# Patient Record
Sex: FEMALE | Race: WHITE | NOT HISPANIC OR LATINO | Employment: FULL TIME | URBAN - METROPOLITAN AREA
[De-identification: names, ages, dates, MRNs, and addresses within clinical notes are randomized per-mention and may not be internally consistent; named-entity substitution may affect disease eponyms.]

---

## 2023-05-04 ENCOUNTER — TELEPHONE (OUTPATIENT)
Dept: PRIMARY CARE | Facility: CLINIC | Age: 73
End: 2023-05-04
Payer: COMMERCIAL

## 2023-05-04 DIAGNOSIS — N39.0 URINARY TRACT INFECTION WITHOUT HEMATURIA, SITE UNSPECIFIED: ICD-10-CM

## 2023-05-05 ENCOUNTER — LAB (OUTPATIENT)
Dept: LAB | Facility: LAB | Age: 73
End: 2023-05-05
Payer: COMMERCIAL

## 2023-05-05 DIAGNOSIS — N39.0 URINARY TRACT INFECTION WITHOUT HEMATURIA, SITE UNSPECIFIED: ICD-10-CM

## 2023-05-05 DIAGNOSIS — N39.0 URINARY TRACT INFECTION WITHOUT HEMATURIA, SITE UNSPECIFIED: Primary | ICD-10-CM

## 2023-05-05 LAB
APPEARANCE, URINE: ABNORMAL
BILIRUBIN, URINE: NEGATIVE
BLOOD, URINE: NEGATIVE
COLOR, URINE: ABNORMAL
GLUCOSE, URINE: NEGATIVE MG/DL
KETONES, URINE: ABNORMAL MG/DL
LEUKOCYTE ESTERASE, URINE: ABNORMAL
MUCUS, URINE: ABNORMAL /LPF
NITRITE, URINE: NEGATIVE
PH, URINE: 5 (ref 5–8)
PROTEIN, URINE: ABNORMAL MG/DL
RBC, URINE: 8 /HPF (ref 0–5)
SPECIFIC GRAVITY, URINE: 1.02 (ref 1–1.03)
SQUAMOUS EPITHELIAL CELLS, URINE: 13 /HPF
UROBILINOGEN, URINE: <2 MG/DL (ref 0–1.9)
WBC, URINE: >182 /HPF (ref 0–5)

## 2023-05-05 PROCEDURE — 81001 URINALYSIS AUTO W/SCOPE: CPT

## 2023-05-05 PROCEDURE — 87186 SC STD MICRODIL/AGAR DIL: CPT

## 2023-05-05 PROCEDURE — 87077 CULTURE AEROBIC IDENTIFY: CPT

## 2023-05-05 PROCEDURE — 87086 URINE CULTURE/COLONY COUNT: CPT

## 2023-05-05 RX ORDER — NITROFURANTOIN 25; 75 MG/1; MG/1
100 CAPSULE ORAL 2 TIMES DAILY
Qty: 14 CAPSULE | Refills: 0 | Status: SHIPPED | OUTPATIENT
Start: 2023-05-05 | End: 2023-05-12

## 2023-05-07 LAB
URINE CULTURE: ABNORMAL
URINE CULTURE: ABNORMAL

## 2023-05-09 ENCOUNTER — TELEPHONE (OUTPATIENT)
Dept: PRIMARY CARE | Facility: CLINIC | Age: 73
End: 2023-05-09
Payer: COMMERCIAL

## 2023-05-15 DIAGNOSIS — I10 ESSENTIAL (PRIMARY) HYPERTENSION: ICD-10-CM

## 2023-05-15 RX ORDER — LOSARTAN POTASSIUM 50 MG/1
TABLET ORAL
Qty: 90 TABLET | Refills: 3 | Status: SHIPPED | OUTPATIENT
Start: 2023-05-15 | End: 2024-06-03

## 2023-05-16 ENCOUNTER — TELEPHONE (OUTPATIENT)
Dept: PRIMARY CARE | Facility: CLINIC | Age: 73
End: 2023-05-16
Payer: COMMERCIAL

## 2023-05-16 NOTE — TELEPHONE ENCOUNTER
Patient say she noticed a swollen vein in her knee , its still swollen and red  with a big bruise now , she want to know if she should worry , she have a appointment on 5/30 with you

## 2023-05-26 ENCOUNTER — APPOINTMENT (OUTPATIENT)
Dept: PRIMARY CARE | Facility: CLINIC | Age: 73
End: 2023-05-26
Payer: COMMERCIAL

## 2023-05-30 ENCOUNTER — OFFICE VISIT (OUTPATIENT)
Dept: PRIMARY CARE | Facility: CLINIC | Age: 73
End: 2023-05-30
Payer: COMMERCIAL

## 2023-05-30 VITALS
HEIGHT: 62 IN | SYSTOLIC BLOOD PRESSURE: 130 MMHG | WEIGHT: 164 LBS | BODY MASS INDEX: 30.18 KG/M2 | DIASTOLIC BLOOD PRESSURE: 80 MMHG

## 2023-05-30 DIAGNOSIS — Z00.00 ROUTINE GENERAL MEDICAL EXAMINATION AT A HEALTH CARE FACILITY: Primary | ICD-10-CM

## 2023-05-30 LAB
ALANINE AMINOTRANSFERASE (SGPT) (U/L) IN SER/PLAS: 19 U/L (ref 7–45)
ALBUMIN (G/DL) IN SER/PLAS: 4.4 G/DL (ref 3.4–5)
ALKALINE PHOSPHATASE (U/L) IN SER/PLAS: 56 U/L (ref 33–136)
ANION GAP IN SER/PLAS: 12 MMOL/L (ref 10–20)
ASPARTATE AMINOTRANSFERASE (SGOT) (U/L) IN SER/PLAS: 19 U/L (ref 9–39)
BASOPHILS (10*3/UL) IN BLOOD BY AUTOMATED COUNT: 0.05 X10E9/L (ref 0–0.1)
BASOPHILS/100 LEUKOCYTES IN BLOOD BY AUTOMATED COUNT: 0.8 % (ref 0–2)
BILIRUBIN TOTAL (MG/DL) IN SER/PLAS: 1.1 MG/DL (ref 0–1.2)
CALCIDIOL (25 OH VITAMIN D3) (NG/ML) IN SER/PLAS: 61 NG/ML
CALCIUM (MG/DL) IN SER/PLAS: 9.7 MG/DL (ref 8.6–10.6)
CARBON DIOXIDE, TOTAL (MMOL/L) IN SER/PLAS: 30 MMOL/L (ref 21–32)
CHLORIDE (MMOL/L) IN SER/PLAS: 106 MMOL/L (ref 98–107)
CHOLESTEROL (MG/DL) IN SER/PLAS: 216 MG/DL (ref 0–199)
CHOLESTEROL IN HDL (MG/DL) IN SER/PLAS: 66.7 MG/DL
CHOLESTEROL/HDL RATIO: 3.2
CREATININE (MG/DL) IN SER/PLAS: 0.66 MG/DL (ref 0.5–1.05)
EOSINOPHILS (10*3/UL) IN BLOOD BY AUTOMATED COUNT: 0.15 X10E9/L (ref 0–0.4)
EOSINOPHILS/100 LEUKOCYTES IN BLOOD BY AUTOMATED COUNT: 2.3 % (ref 0–6)
ERYTHROCYTE DISTRIBUTION WIDTH (RATIO) BY AUTOMATED COUNT: 12.8 % (ref 11.5–14.5)
ERYTHROCYTE MEAN CORPUSCULAR HEMOGLOBIN CONCENTRATION (G/DL) BY AUTOMATED: 31 G/DL (ref 32–36)
ERYTHROCYTE MEAN CORPUSCULAR VOLUME (FL) BY AUTOMATED COUNT: 96 FL (ref 80–100)
ERYTHROCYTES (10*6/UL) IN BLOOD BY AUTOMATED COUNT: 4.66 X10E12/L (ref 4–5.2)
ESTIMATED AVERAGE GLUCOSE FOR HBA1C: 97 MG/DL
GFR FEMALE: >90 ML/MIN/1.73M2
GLUCOSE (MG/DL) IN SER/PLAS: 99 MG/DL (ref 74–99)
HEMATOCRIT (%) IN BLOOD BY AUTOMATED COUNT: 44.9 % (ref 36–46)
HEMOGLOBIN (G/DL) IN BLOOD: 13.9 G/DL (ref 12–16)
HEMOGLOBIN A1C/HEMOGLOBIN TOTAL IN BLOOD: 5 %
IMMATURE GRANULOCYTES/100 LEUKOCYTES IN BLOOD BY AUTOMATED COUNT: 0.2 % (ref 0–0.9)
LDL: 139 MG/DL (ref 0–99)
LEUKOCYTES (10*3/UL) IN BLOOD BY AUTOMATED COUNT: 6.6 X10E9/L (ref 4.4–11.3)
LYMPHOCYTES (10*3/UL) IN BLOOD BY AUTOMATED COUNT: 1.12 X10E9/L (ref 0.8–3)
LYMPHOCYTES/100 LEUKOCYTES IN BLOOD BY AUTOMATED COUNT: 16.9 % (ref 13–44)
MONOCYTES (10*3/UL) IN BLOOD BY AUTOMATED COUNT: 0.27 X10E9/L (ref 0.05–0.8)
MONOCYTES/100 LEUKOCYTES IN BLOOD BY AUTOMATED COUNT: 4.1 % (ref 2–10)
NEUTROPHILS (10*3/UL) IN BLOOD BY AUTOMATED COUNT: 5.01 X10E9/L (ref 1.6–5.5)
NEUTROPHILS/100 LEUKOCYTES IN BLOOD BY AUTOMATED COUNT: 75.7 % (ref 40–80)
NRBC (PER 100 WBCS) BY AUTOMATED COUNT: 0 /100 WBC (ref 0–0)
PLATELETS (10*3/UL) IN BLOOD AUTOMATED COUNT: 286 X10E9/L (ref 150–450)
POTASSIUM (MMOL/L) IN SER/PLAS: 4.4 MMOL/L (ref 3.5–5.3)
PROTEIN TOTAL: 6.8 G/DL (ref 6.4–8.2)
SODIUM (MMOL/L) IN SER/PLAS: 144 MMOL/L (ref 136–145)
THYROTROPIN (MIU/L) IN SER/PLAS BY DETECTION LIMIT <= 0.05 MIU/L: 3.25 MIU/L (ref 0.44–3.98)
TRIGLYCERIDE (MG/DL) IN SER/PLAS: 52 MG/DL (ref 0–149)
UREA NITROGEN (MG/DL) IN SER/PLAS: 9 MG/DL (ref 6–23)
VLDL: 10 MG/DL (ref 0–40)

## 2023-05-30 PROCEDURE — 84443 ASSAY THYROID STIM HORMONE: CPT

## 2023-05-30 PROCEDURE — 80061 LIPID PANEL: CPT

## 2023-05-30 PROCEDURE — 83036 HEMOGLOBIN GLYCOSYLATED A1C: CPT

## 2023-05-30 PROCEDURE — 85025 COMPLETE CBC W/AUTO DIFF WBC: CPT

## 2023-05-30 PROCEDURE — 1160F RVW MEDS BY RX/DR IN RCRD: CPT | Performed by: INTERNAL MEDICINE

## 2023-05-30 PROCEDURE — 1159F MED LIST DOCD IN RCRD: CPT | Performed by: INTERNAL MEDICINE

## 2023-05-30 PROCEDURE — 36415 COLL VENOUS BLD VENIPUNCTURE: CPT

## 2023-05-30 PROCEDURE — 82306 VITAMIN D 25 HYDROXY: CPT

## 2023-05-30 PROCEDURE — 99397 PER PM REEVAL EST PAT 65+ YR: CPT | Performed by: INTERNAL MEDICINE

## 2023-05-30 PROCEDURE — 80053 COMPREHEN METABOLIC PANEL: CPT

## 2023-05-30 RX ORDER — ESTRADIOL 0.1 MG/G
2 CREAM VAGINAL DAILY
COMMUNITY
Start: 2021-02-15 | End: 2023-11-06 | Stop reason: ALTCHOICE

## 2023-05-30 RX ORDER — NALTREXONE HYDROCHLORIDE AND BUPROPION HYDROCHLORIDE 8; 90 MG/1; MG/1
2 TABLET, EXTENDED RELEASE ORAL 2 TIMES DAILY
COMMUNITY
Start: 2021-02-15 | End: 2023-12-12

## 2023-05-30 RX ORDER — CIMETIDINE 800 MG/1
800 TABLET, FILM COATED ORAL NIGHTLY
COMMUNITY
End: 2023-06-05

## 2023-05-30 NOTE — PROGRESS NOTES
Feliciano Marley is a 71 yo F presenting in FU.       *This year left patellar lesion which was long, worm like, red and then with associated bruising, resoled without sequlae     *Skin rash bilat flexor surface c/w eczema   -reassurance   -hydrocortisone PRN on home creams      1. HTN   -losart 50     2. Obesity, overweight  -Contrave   -intol Qsymia   -may consider GLP1s orally if ever approvable  -low 200s peak and 158 tasha, holding steady at 158-164 range     3. GERD, resolved off meds     4. VICKY   -FU with gyne onc for vuvlar colposcopies - last 2.23     5. Palpitations   -zio 12/20 ectopics and short svt runs     6. GERD   -cimetidine 800 mg daily   -repeat EGD c cscope in 12.24     #HM   [ ]due labs   -shingrix x2   -cscope 12.2019 - 5 years- due 12.24- repeat with EGD at that time  -mammo/pap osh gyne   -tdap 2015   -dexa 9.22   -cac 9.22 zero                     Gen Aox3 NAD well appearing   HEENT mmm pharynx clear no cervical LAD   Eyes sclerae clear   CV rrr nl s1/2 no m/r/g  Pulm CTAB no adventitious sounds   Ext warm dry no edema 2+ DP pulse

## 2023-05-30 NOTE — PATIENT INSTRUCTIONS
Feliciano,     Go downstairs for full labs today!   Here are a couple therapists to try calling: Dr. Cassandra Fuentes - ; or anyone at [phone] ([facility]).

## 2023-06-05 DIAGNOSIS — K21.9 GASTRO-ESOPHAGEAL REFLUX DISEASE WITHOUT ESOPHAGITIS: ICD-10-CM

## 2023-06-05 RX ORDER — CIMETIDINE 800 MG/1
TABLET, FILM COATED ORAL
Qty: 90 TABLET | Refills: 3 | Status: SHIPPED | OUTPATIENT
Start: 2023-06-05

## 2023-09-19 ENCOUNTER — OFFICE VISIT (OUTPATIENT)
Dept: PRIMARY CARE | Facility: CLINIC | Age: 73
End: 2023-09-19
Payer: COMMERCIAL

## 2023-09-19 VITALS — SYSTOLIC BLOOD PRESSURE: 117 MMHG | HEART RATE: 76 BPM | DIASTOLIC BLOOD PRESSURE: 79 MMHG

## 2023-09-19 DIAGNOSIS — R21 SKIN RASH: ICD-10-CM

## 2023-09-19 DIAGNOSIS — Z23 ENCOUNTER FOR VACCINATION: Primary | ICD-10-CM

## 2023-09-19 DIAGNOSIS — K59.09 CHRONIC CONSTIPATION: ICD-10-CM

## 2023-09-19 DIAGNOSIS — M54.2 CERVICALGIA: ICD-10-CM

## 2023-09-19 PROCEDURE — 1160F RVW MEDS BY RX/DR IN RCRD: CPT | Performed by: INTERNAL MEDICINE

## 2023-09-19 PROCEDURE — 99214 OFFICE O/P EST MOD 30 MIN: CPT | Performed by: INTERNAL MEDICINE

## 2023-09-19 PROCEDURE — 1159F MED LIST DOCD IN RCRD: CPT | Performed by: INTERNAL MEDICINE

## 2023-09-19 NOTE — PROGRESS NOTES
Feliciano Marley presents for several concerns:     Constipation - taking Senokot gummies, Miralax, Colace - still only 1 BM per week and hard to pass. Usually requires a Fleets suppository to have a BM. Despite daily Senokot and Colace is having very infrequent difficult to pass Bms. No h/o prescription meds. H/o IBS and previously diagnosed CIC.   Cervicalgia - sig pain, crepitus in the neck. No radicular features.   GERD - cimetidine 800 mg daily.     #HM   -screen labs 5.23   -shingrix x2   -cscope 12.2019 - 5 years- due 12.24- repeat with EGD at that time  -mammo/pap osh gyne   -tdap 2015   -dexa 9.22   -cac 9.22 zero          ROS   Gen neg fever neg chills neg malaise   CV neg CP neg palpitations   Neuro neg limb weakness neg parestheasis       Gen Aox3 NAD well appearing   HEENT mmm pharynx clear no cervical LAD   Eyes sclerae clear   CV rrr nl s1/2 no m/r/g  Pulm CTAB no adventitious sounds   Ext warm dry no edema 2+ DP pulse       A/P     Take Colace and Senokot once daily along with 64 oz of water or as close to this as you can. I will try though to prescribe Linzess which if covered, is one pill once a day in the morning. Try it for 2 weeks then give me an update. Take the cimetidine daily for the acid. Once things settle down, let's see me back to discuss meds like Ozempic for weight loss.   Call the referral line to schedule a visit with physical therapy.   Have [facility] [place] Dr. Ravi [phone] to schedule colonoscopy, his order is in!   For the skin rash, call the referral line to schedule visits for both you and [place] - I recommend Dr. Vincent Grossman.

## 2023-09-19 NOTE — PATIENT INSTRUCTIONS
Feliciano,     Take Colace and Senokot once daily along with 64 oz of water or as close to this as you can. I will try though to prescribe Linzess which if covered, is one pill once a day in the morning. Try it for 2 weeks then give me an update. Take the cimetidine daily for the acid. Once things settle down, let's see me back to discuss meds like Ozempic for weight loss.   Call the referral line to schedule a visit with physical therapy.   Have [facility] call Dr. Ravi [phone] to schedule colonoscopy, his order is in!   For the skin rash, call the referral line to schedule visits for both you and [place] - I recommend Dr. Vincent Grossman.     CL

## 2023-10-18 ENCOUNTER — TELEPHONE (OUTPATIENT)
Dept: PRIMARY CARE | Facility: CLINIC | Age: 73
End: 2023-10-18
Payer: COMMERCIAL

## 2023-11-04 PROBLEM — I10 BENIGN ESSENTIAL HTN: Status: ACTIVE | Noted: 2023-11-04

## 2023-11-04 PROBLEM — D22.61 MELANOCYTIC NEVI OF RIGHT UPPER LIMB, INCLUDING SHOULDER: Status: ACTIVE | Noted: 2022-10-12

## 2023-11-04 PROBLEM — D07.1 VULVAR INTRAEPITHELIAL NEOPLASIA (VIN) GRADE 3: Status: ACTIVE | Noted: 2021-01-29

## 2023-11-04 PROBLEM — D22.5 MELANOCYTIC NEVI OF TRUNK: Status: ACTIVE | Noted: 2022-10-12

## 2023-11-04 PROBLEM — L82.0 INFLAMED SEBORRHEIC KERATOSIS: Status: ACTIVE | Noted: 2022-10-12

## 2023-11-04 PROBLEM — H61.20 CERUMINOSIS: Status: ACTIVE | Noted: 2023-11-04

## 2023-11-04 PROBLEM — N90.5 VULVAR ATROPHY: Status: ACTIVE | Noted: 2023-11-04

## 2023-11-04 PROBLEM — D18.01 HEMANGIOMA OF SKIN AND SUBCUTANEOUS TISSUE: Status: ACTIVE | Noted: 2022-10-12

## 2023-11-04 PROBLEM — Z04.9 CONDITION NOT FOUND: Status: ACTIVE | Noted: 2023-11-04

## 2023-11-04 PROBLEM — F41.9 ANXIETY: Status: ACTIVE | Noted: 2021-01-29

## 2023-11-04 PROBLEM — N90.89 LESION OF VULVA: Status: ACTIVE | Noted: 2021-01-29

## 2023-11-04 PROBLEM — D22.71 MELANOCYTIC NEVI OF RIGHT LOWER LIMB, INCLUDING HIP: Status: ACTIVE | Noted: 2022-10-12

## 2023-11-04 PROBLEM — D22.72 MELANOCYTIC NEVI OF LEFT LOWER LIMB, INCLUDING HIP: Status: ACTIVE | Noted: 2022-10-12

## 2023-11-04 PROBLEM — R00.2 PALPITATIONS: Status: ACTIVE | Noted: 2021-01-29

## 2023-11-04 PROBLEM — K21.9 GERD (GASTROESOPHAGEAL REFLUX DISEASE): Status: ACTIVE | Noted: 2023-11-04

## 2023-11-04 PROBLEM — R73.9 BORDERLINE HYPERGLYCEMIA: Status: ACTIVE | Noted: 2023-11-04

## 2023-11-04 PROBLEM — L82.1 OTHER SEBORRHEIC KERATOSIS: Status: ACTIVE | Noted: 2022-10-12

## 2023-11-04 PROBLEM — D22.62 MELANOCYTIC NEVI OF LEFT UPPER LIMB, INCLUDING SHOULDER: Status: ACTIVE | Noted: 2022-10-12

## 2023-11-04 PROBLEM — L81.4 OTHER MELANIN HYPERPIGMENTATION: Status: ACTIVE | Noted: 2022-10-12

## 2023-11-04 PROBLEM — R30.0 DYSURIA: Status: ACTIVE | Noted: 2023-11-04

## 2023-11-04 PROBLEM — B00.9 HERPES SIMPLEX VIRUS (HSV) INFECTION: Status: ACTIVE | Noted: 2021-01-29

## 2023-11-04 PROBLEM — R03.0 PREHYPERTENSION: Status: ACTIVE | Noted: 2021-01-29

## 2023-11-04 PROBLEM — M85.80 BORDERLINE OSTEOPENIA: Status: ACTIVE | Noted: 2023-11-04

## 2023-11-04 PROBLEM — E66.9 ADULT-ONSET OBESITY: Status: ACTIVE | Noted: 2023-11-04

## 2023-11-04 PROBLEM — Z86.39 H/O: OBESITY: Status: ACTIVE | Noted: 2021-01-29

## 2023-11-05 NOTE — PATIENT INSTRUCTIONS
Routine Gynecologic Visit:  You were seen today for a routine gyn visit with normal findings on exam  Your pap smear was done today. You will receive results by phone/MyChart in 1-2 weeks. You should still continue to get annual breast and pelvic exams in the office.  An order was placed in the system for mammogram. Please get done at your earliest convenience  If you are having any gynecologic issues in the next year you should call the office. [phone] ([place]) or [phone] ([place])

## 2023-11-06 ENCOUNTER — OFFICE VISIT (OUTPATIENT)
Dept: OBSTETRICS AND GYNECOLOGY | Facility: CLINIC | Age: 73
End: 2023-11-06
Payer: COMMERCIAL

## 2023-11-06 VITALS — SYSTOLIC BLOOD PRESSURE: 142 MMHG | BODY MASS INDEX: 29.05 KG/M2 | DIASTOLIC BLOOD PRESSURE: 72 MMHG | HEIGHT: 63 IN

## 2023-11-06 DIAGNOSIS — Z01.419 WELL WOMAN EXAM: Primary | ICD-10-CM

## 2023-11-06 DIAGNOSIS — Z91.89 HISTORY OF DES EXPOSURE IN UTERO: ICD-10-CM

## 2023-11-06 DIAGNOSIS — Z12.31 VISIT FOR SCREENING MAMMOGRAM: ICD-10-CM

## 2023-11-06 PROCEDURE — 3078F DIAST BP <80 MM HG: CPT | Performed by: OBSTETRICS & GYNECOLOGY

## 2023-11-06 PROCEDURE — 88175 CYTOPATH C/V AUTO FLUID REDO: CPT

## 2023-11-06 PROCEDURE — 1160F RVW MEDS BY RX/DR IN RCRD: CPT | Performed by: OBSTETRICS & GYNECOLOGY

## 2023-11-06 PROCEDURE — 99397 PER PM REEVAL EST PAT 65+ YR: CPT | Performed by: OBSTETRICS & GYNECOLOGY

## 2023-11-06 PROCEDURE — 1036F TOBACCO NON-USER: CPT | Performed by: OBSTETRICS & GYNECOLOGY

## 2023-11-06 PROCEDURE — 1159F MED LIST DOCD IN RCRD: CPT | Performed by: OBSTETRICS & GYNECOLOGY

## 2023-11-06 PROCEDURE — 3077F SYST BP >= 140 MM HG: CPT | Performed by: OBSTETRICS & GYNECOLOGY

## 2023-11-06 PROCEDURE — 88141 CYTOPATH C/V INTERPRET: CPT | Performed by: PATHOLOGY

## 2023-11-06 PROCEDURE — 87624 HPV HI-RISK TYP POOLED RSLT: CPT

## 2023-11-06 ASSESSMENT — ENCOUNTER SYMPTOMS
CONSTITUTIONAL NEGATIVE: 1
CARDIOVASCULAR NEGATIVE: 1
NEUROLOGICAL NEGATIVE: 1
GASTROINTESTINAL NEGATIVE: 1
MUSCULOSKELETAL NEGATIVE: 1
RESPIRATORY NEGATIVE: 1
PSYCHIATRIC NEGATIVE: 1

## 2023-11-06 NOTE — PROGRESS NOTES
"Subjective   Joyce Marley is a 72 y.o. female who presents for annual exam. The patient has no complaints today. The patient is sexually active. GYN screening history: last pap: ASCUS/HPV neg . The patient is not taking hormone replacement therapy. Patient denies post-menopausal vaginal bleeding.. The patient wears seatbelts: yes. The patient participates in regular exercise: yes. Has the patient ever been transfused or tattooed?: not asked. The patient reports that there is not domestic violence in their life.     Menstrual History:  OB History          2    Para   2    Term                AB        Living             SAB        IAB        Ectopic        Multiple        Live Births                      No LMP recorded. Patient is postmenopausal.         Review of Systems   Constitutional: Negative.    Respiratory: Negative.     Cardiovascular: Negative.    Gastrointestinal: Negative.    Genitourinary: Negative.    Musculoskeletal: Negative.    Skin: Negative.    Neurological: Negative.    Psychiatric/Behavioral: Negative.          Objective   /72   Ht 1.6 m (5' 3\")   BMI 29.05 kg/m²     General:   alert and oriented, in no acute distress   Heart: regular rate and rhythm   Lungs: clear to auscultation bilaterally   Abdomen: soft, non-tender, without masses or organomegaly   Pelvis: Vulva normal in appearance without discoloration, rashes, lesions. Vagina is negative for blood, discharge, lesions. Uterus is small, mobile, non tender. There is no cervical motion tenderness, adnexal masses/tenderness.    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

## 2023-11-15 LAB

## 2023-12-06 ENCOUNTER — OFFICE VISIT (OUTPATIENT)
Dept: OBSTETRICS AND GYNECOLOGY | Facility: CLINIC | Age: 73
End: 2023-12-06
Payer: COMMERCIAL

## 2023-12-06 VITALS — SYSTOLIC BLOOD PRESSURE: 134 MMHG | WEIGHT: 160 LBS | DIASTOLIC BLOOD PRESSURE: 84 MMHG | BODY MASS INDEX: 28.34 KG/M2

## 2023-12-06 DIAGNOSIS — R87.610 ASCUS OF CERVIX WITH NEGATIVE HIGH RISK HPV: Primary | ICD-10-CM

## 2023-12-06 PROCEDURE — 3079F DIAST BP 80-89 MM HG: CPT | Performed by: OBSTETRICS & GYNECOLOGY

## 2023-12-06 PROCEDURE — 3075F SYST BP GE 130 - 139MM HG: CPT | Performed by: OBSTETRICS & GYNECOLOGY

## 2023-12-06 PROCEDURE — 1036F TOBACCO NON-USER: CPT | Performed by: OBSTETRICS & GYNECOLOGY

## 2023-12-06 PROCEDURE — 1160F RVW MEDS BY RX/DR IN RCRD: CPT | Performed by: OBSTETRICS & GYNECOLOGY

## 2023-12-06 PROCEDURE — 57452 EXAM OF CERVIX W/SCOPE: CPT | Performed by: OBSTETRICS & GYNECOLOGY

## 2023-12-06 PROCEDURE — 1159F MED LIST DOCD IN RCRD: CPT | Performed by: OBSTETRICS & GYNECOLOGY

## 2023-12-06 NOTE — PROGRESS NOTES
Patient ID: Joyce Marley is a 72 y.o. female.    Colposcopy    Date/Time: 12/6/2023 3:08 PM    Performed by: Betty Alvarez DO  Authorized by: Betty Alvarez DO    Consent:     Patient questions answered: yes      Risks and benefits of the procedure and its alternatives discussed: yes      Procedural risks discussed:  Bleeding and infection    Consent obtained:  Written    Consent given by:  Patient  Indication:     Other indication(s): clinical abnormality    Pre-procedure:     Speculum was placed in the vagina: yes      Prep solution(s): acetic acid    Procedure:     Colposcopy with: colposcopy only      Cervix visibility: fully visualized      SCJ visibility: fully visualized    Post-procedure:     Patient tolerance of procedure:  Patient tolerated the procedure well with no immediate complications    Instructions and paperwork completed: yes      Educational handouts given: yes    Betty Alvarez DO

## 2023-12-06 NOTE — PATIENT INSTRUCTIONS
Visit for Colposcopy:  You were seen today for colposcopy for abnormal pap smear  Colposcopy is a type of examination that allows for better visualization of abnormal cervical cells, generally caused by infection with Human Papilloma Virus (HPV)  You may have had a biopsy done to better diagnose cervical abnormalities and plan for future management. If so, you may have some spotting to light vaginal bleeding for a few days. Sometimes a medication called Monsel's is used after cervical biopsies to stop bleeding, and this medication often causes a discharge that looks like coffee grounds  If a biopsy was done during your colposcopy you will receive your results by phone/MyChart  Recommendations for follow up pap smears/treatment is dependent on colposcopy/biopsy findings  Monitor for signs of infection and call the office for any fever, chills, severe/worsening pelvic pain, foul smelling vaginal discharge. [phone] [place] [phone]

## 2023-12-12 ENCOUNTER — TELEPHONE (OUTPATIENT)
Dept: PRIMARY CARE | Facility: CLINIC | Age: 73
End: 2023-12-12
Payer: COMMERCIAL

## 2023-12-12 DIAGNOSIS — Z86.39 PERSONAL HISTORY OF OTHER ENDOCRINE, NUTRITIONAL AND METABOLIC DISEASE: Primary | ICD-10-CM

## 2023-12-12 RX ORDER — NALTREXONE HYDROCHLORIDE AND BUPROPION HYDROCHLORIDE 8; 90 MG/1; MG/1
2 TABLET, EXTENDED RELEASE ORAL 2 TIMES DAILY
Qty: 360 TABLET | Refills: 0 | Status: SHIPPED | OUTPATIENT
Start: 2023-12-12 | End: 2024-02-19

## 2024-02-02 ENCOUNTER — OFFICE VISIT (OUTPATIENT)
Dept: OPHTHALMOLOGY | Facility: CLINIC | Age: 74
End: 2024-02-02
Payer: COMMERCIAL

## 2024-02-02 DIAGNOSIS — H52.03 HYPERMETROPIA OF BOTH EYES: ICD-10-CM

## 2024-02-02 DIAGNOSIS — H52.223 REGULAR ASTIGMATISM OF BOTH EYES: ICD-10-CM

## 2024-02-02 DIAGNOSIS — H35.61 RETINAL HEMORRHAGE OF RIGHT EYE: Primary | ICD-10-CM

## 2024-02-02 DIAGNOSIS — H52.4 PRESBYOPIA: ICD-10-CM

## 2024-02-02 PROBLEM — H52.203 HYPEROPIA OF BOTH EYES WITH ASTIGMATISM AND PRESBYOPIA: Status: ACTIVE | Noted: 2024-02-02

## 2024-02-02 PROCEDURE — 92015 DETERMINE REFRACTIVE STATE: CPT | Performed by: OPTOMETRIST

## 2024-02-02 PROCEDURE — 92134 CPTRZ OPH DX IMG PST SGM RTA: CPT | Mod: BILATERAL PROCEDURE | Performed by: OPTOMETRIST

## 2024-02-02 PROCEDURE — 99204 OFFICE O/P NEW MOD 45 MIN: CPT | Performed by: OPTOMETRIST

## 2024-02-02 ASSESSMENT — CONF VISUAL FIELD
OS_SUPERIOR_TEMPORAL_RESTRICTION: 0
OS_NORMAL: 1
METHOD: COUNTING FINGERS
OD_INFERIOR_NASAL_RESTRICTION: 0
OD_SUPERIOR_NASAL_RESTRICTION: 0
OS_INFERIOR_NASAL_RESTRICTION: 0
OD_NORMAL: 1
OS_INFERIOR_TEMPORAL_RESTRICTION: 0
OD_SUPERIOR_TEMPORAL_RESTRICTION: 0
OS_SUPERIOR_NASAL_RESTRICTION: 0
OD_INFERIOR_TEMPORAL_RESTRICTION: 0

## 2024-02-02 ASSESSMENT — REFRACTION_WEARINGRX
SPECS_TYPE: COMPUTER BIFOCAL
OD_AXIS: 026
OD_SPHERE: +1.50
OS_ADD: +2.75
OS_AXIS: 158
OS_CYLINDER: +0.75
OD_ADD: +2.75
OD_ADD: +1.50
OS_ADD: +1.50
OS_CYLINDER: +0.75
OD_AXIS: 026
OD_CYLINDER: +0.75
OS_SPHERE: +0.50
OS_AXIS: 158
OD_CYLINDER: +0.75
OS_SPHERE: +1.75
OD_SPHERE: +0.25

## 2024-02-02 ASSESSMENT — VISUAL ACUITY
CORRECTION_TYPE: GLASSES
OS_CC: 20/30+2
METHOD: SNELLEN - LINEAR
OS_PH_CC: 20/20-
OD_CC: 20/25+2

## 2024-02-02 ASSESSMENT — TONOMETRY
OS_IOP_MMHG: 18
IOP_METHOD: GOLDMANN APPLANATION
OD_IOP_MMHG: 18

## 2024-02-02 ASSESSMENT — REFRACTION_MANIFEST
OS_ADD: +2.75
OD_CYLINDER: +0.50
OS_AXIS: 158
OD_AXIS: 036
OD_SPHERE: +0.50
OS_CYLINDER: +0.25
OS_SPHERE: +1.00
OD_ADD: +2.75

## 2024-02-02 ASSESSMENT — ENCOUNTER SYMPTOMS
PSYCHIATRIC NEGATIVE: 0
ENDOCRINE NEGATIVE: 0
MUSCULOSKELETAL NEGATIVE: 0
CARDIOVASCULAR NEGATIVE: 1
RESPIRATORY NEGATIVE: 0
ALLERGIC/IMMUNOLOGIC NEGATIVE: 0
CONSTITUTIONAL NEGATIVE: 0
GASTROINTESTINAL NEGATIVE: 0
HEMATOLOGIC/LYMPHATIC NEGATIVE: 0
EYES NEGATIVE: 0
NEUROLOGICAL NEGATIVE: 0

## 2024-02-02 ASSESSMENT — SLIT LAMP EXAM - LIDS
COMMENTS: NORMAL
COMMENTS: NORMAL

## 2024-02-02 ASSESSMENT — CUP TO DISC RATIO
OS_RATIO: 0.25
OD_RATIO: 0.25

## 2024-02-02 ASSESSMENT — EXTERNAL EXAM - LEFT EYE: OS_EXAM: NORMAL

## 2024-02-02 ASSESSMENT — EXTERNAL EXAM - RIGHT EYE: OD_EXAM: NORMAL

## 2024-02-02 NOTE — PROGRESS NOTES
Assessment/Plan   Diagnoses and all orders for this visit:  Retinal hemorrhage of right eye  -     OCT, Retina - OU - Both Eyes  Small pocket of edema in area of dot blot hemorrhage inferior to macula. Likely due to valsalva maneuver due to no other signs of retinopathy and patients reported history of excessive sneezing. Monitor in 3-4 months with DFE and OCT Macula or sooner if any other visual problems    Hyperopia of both eyes with astigmatism and presbyopia  Released updated distance spec rx  and computer rx. RTC in 3-4 months for DFE & OCT or sooner if any changes in vision.

## 2024-02-05 ENCOUNTER — OFFICE VISIT (OUTPATIENT)
Dept: DERMATOLOGY | Facility: HOSPITAL | Age: 74
End: 2024-02-05
Payer: COMMERCIAL

## 2024-02-05 DIAGNOSIS — B35.4 TINEA CORPORIS: Primary | ICD-10-CM

## 2024-02-05 DIAGNOSIS — L64.9 ANDROGENETIC ALOPECIA: ICD-10-CM

## 2024-02-05 DIAGNOSIS — R21 SKIN RASH: ICD-10-CM

## 2024-02-05 PROBLEM — D22.72 MELANOCYTIC NEVI OF LEFT LOWER LIMB, INCLUDING HIP: Status: RESOLVED | Noted: 2022-10-12 | Resolved: 2024-02-05

## 2024-02-05 PROBLEM — D22.61 MELANOCYTIC NEVI OF RIGHT UPPER LIMB, INCLUDING SHOULDER: Status: RESOLVED | Noted: 2022-10-12 | Resolved: 2024-02-05

## 2024-02-05 PROBLEM — L82.1 OTHER SEBORRHEIC KERATOSIS: Status: RESOLVED | Noted: 2022-10-12 | Resolved: 2024-02-05

## 2024-02-05 PROBLEM — D18.01 HEMANGIOMA OF SKIN AND SUBCUTANEOUS TISSUE: Status: RESOLVED | Noted: 2022-10-12 | Resolved: 2024-02-05

## 2024-02-05 PROBLEM — L82.0 INFLAMED SEBORRHEIC KERATOSIS: Status: RESOLVED | Noted: 2022-10-12 | Resolved: 2024-02-05

## 2024-02-05 PROBLEM — D22.71 MELANOCYTIC NEVI OF RIGHT LOWER LIMB, INCLUDING HIP: Status: RESOLVED | Noted: 2022-10-12 | Resolved: 2024-02-05

## 2024-02-05 PROBLEM — D22.62 MELANOCYTIC NEVI OF LEFT UPPER LIMB, INCLUDING SHOULDER: Status: RESOLVED | Noted: 2022-10-12 | Resolved: 2024-02-05

## 2024-02-05 PROBLEM — L81.4 OTHER MELANIN HYPERPIGMENTATION: Status: RESOLVED | Noted: 2022-10-12 | Resolved: 2024-02-05

## 2024-02-05 PROCEDURE — 1159F MED LIST DOCD IN RCRD: CPT | Performed by: DERMATOLOGY

## 2024-02-05 PROCEDURE — 99213 OFFICE O/P EST LOW 20 MIN: CPT | Performed by: DERMATOLOGY

## 2024-02-05 PROCEDURE — 1160F RVW MEDS BY RX/DR IN RCRD: CPT | Performed by: DERMATOLOGY

## 2024-02-05 PROCEDURE — 87220 TISSUE EXAM FOR FUNGI: CPT | Performed by: DERMATOLOGY

## 2024-02-05 PROCEDURE — 1036F TOBACCO NON-USER: CPT | Performed by: DERMATOLOGY

## 2024-02-05 RX ORDER — KETOCONAZOLE 20 MG/G
CREAM TOPICAL 2 TIMES DAILY
Qty: 60 G | Refills: 1 | Status: SHIPPED | OUTPATIENT
Start: 2024-02-05

## 2024-02-05 ASSESSMENT — PATIENT GLOBAL ASSESSMENT (PGA)
PATIENT GLOBAL ASSESSMENT: PATIENT GLOBAL ASSESSMENT:  2 - MILD
PATIENT GLOBAL ASSESSMENT: PATIENT GLOBAL ASSESSMENT:  1 - CLEAR

## 2024-02-05 ASSESSMENT — DERMATOLOGY QUALITY OF LIFE (QOL) ASSESSMENT
RATE HOW BOTHERED YOU ARE BY SYMPTOMS OF YOUR SKIN PROBLEM (EG, ITCHING, STINGING BURNING, HURTING OR SKIN IRRITATION): 0 - NEVER BOTHERED
RATE HOW EMOTIONALLY BOTHERED YOU ARE BY YOUR SKIN PROBLEM (FOR EXAMPLE, WORRY, EMBARRASSMENT, FRUSTRATION): 0 - NEVER BOTHERED
RATE HOW EMOTIONALLY BOTHERED YOU ARE BY YOUR SKIN PROBLEM (FOR EXAMPLE, WORRY, EMBARRASSMENT, FRUSTRATION): 0 - NEVER BOTHERED
RATE HOW BOTHERED YOU ARE BY EFFECTS OF YOUR SKIN PROBLEMS ON YOUR ACTIVITIES (EG, GOING OUT, ACCOMPLISHING WHAT YOU WANT, WORK ACTIVITIES OR YOUR RELATIONSHIPS WITH OTHERS): 0 - NEVER BOTHERED
RATE HOW BOTHERED YOU ARE BY SYMPTOMS OF YOUR SKIN PROBLEM (EG, ITCHING, STINGING BURNING, HURTING OR SKIN IRRITATION): 0 - NEVER BOTHERED
RATE HOW BOTHERED YOU ARE BY EFFECTS OF YOUR SKIN PROBLEMS ON YOUR ACTIVITIES (EG, GOING OUT, ACCOMPLISHING WHAT YOU WANT, WORK ACTIVITIES OR YOUR RELATIONSHIPS WITH OTHERS): 0 - NEVER BOTHERED
ARE THERE EXCLUSIONS OR EXCEPTIONS FOR THE QUALITY OF LIFE ASSESSMENT: NO
DATE THE QUALITY-OF-LIFE ASSESSMENT WAS COMPLETED: 66875
DATE THE QUALITY-OF-LIFE ASSESSMENT WAS COMPLETED: 66875

## 2024-02-05 ASSESSMENT — DERMATOLOGY PATIENT ASSESSMENT
DO YOU HAVE IRREGULAR MENSTRUAL CYCLES: NO
ARE YOU TRYING TO GET PREGNANT: NO
DO YOU USE A TANNING BED: NO
DO YOU USE SUNSCREEN: OCCASIONALLY
HAVE YOU HAD OR DO YOU HAVE A STAPH INFECTION: NO
WHERE ARE THESE NEW OR CHANGING LESIONS LOCATED: ARMS
DO YOU HAVE ANY NEW OR CHANGING LESIONS: YES
ARE YOU ON BIRTH CONTROL: NO
ARE YOU AN ORGAN TRANSPLANT RECIPIENT: NO

## 2024-02-05 ASSESSMENT — ITCH NUMERIC RATING SCALE
HOW SEVERE IS YOUR ITCHING?: 3
HOW SEVERE IS YOUR ITCHING?: 0

## 2024-02-05 NOTE — PATIENT INSTRUCTIONS
Recommend Minoxidil (Rogaine) 5% foam  Apply ONCE per day  [facility] / [employer] / [employer] [facility] [employer] have the best prices

## 2024-02-05 NOTE — PROGRESS NOTES
Subjective     Joyce Marley is a 73 y.o. female who presents for the following: Suspicious Skin Lesion (arms).     Last derm visit 10/2022 for Full Skin Exam, no lesions of concern. 2 inflamed seborrheic keratoses on clavicle treated with liquid nitrogen       Review of Systems:  No other skin or systemic complaints other than what is documented elsewhere in the note.    The following portions of the chart were reviewed this encounter and updated as appropriate:   Tobacco  Allergies  Meds  Problems  Med Hx  Surg Hx         Skin Cancer History  No skin cancer on file.      Specialty Problems          Dermatology Problems    Melanocytic nevi of trunk        Objective   Well appearing patient in no apparent distress; mood and affect are within normal limits.    A focused skin examination was performed. All findings within normal limits unless otherwise noted below.    Assessment/Plan   1. Tinea corporis (2)  Left Antecubital Fossa, Right Antecubital Fossa  Pink scaly annular plaques, 3x5 cm symmetrically in antecubital fossae    Rash prsent since October, itchy, transiently responded to cortisone and then wound return and be larger. She does have a dog. 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     CORBIN today positive for long branching hyphae     Related Procedures  POCT KOH Prep - DERM Manually Resulted    Related Medications  ketoconazole (NIZOral) 2 % cream  Apply topically 2 times a day. To affected areas of inside of elbows for 4 weeks    2. Skin rash    Related Procedures  Referral to Dermatology    3. Androgenetic alopecia  Scalp  Decreased hair density in androgenetic areas. On dermoscopy, miniaturized hair follicles are seen and hair shafts of varying diameters are noted.                      - start with OTC minoxidil (see AVS)  - follow up in 6 months and gauge response and see if further work-up and/or treatment are indicated    Related Procedures  Follow Up In Dermatology -  Established Patient        Follow up 6 months hair

## 2024-02-06 LAB — POC KOH - DERM RESULT 1: ABNORMAL

## 2024-02-13 ENCOUNTER — TELEPHONE (OUTPATIENT)
Dept: DERMATOLOGY | Facility: CLINIC | Age: 74
End: 2024-02-13
Payer: COMMERCIAL

## 2024-02-13 DIAGNOSIS — B35.4 TINEA CORPORIS: Primary | ICD-10-CM

## 2024-02-13 DIAGNOSIS — Z51.81 ENCOUNTER FOR THERAPEUTIC DRUG LEVEL MONITORING: ICD-10-CM

## 2024-02-13 RX ORDER — LORAZEPAM 0.5 MG/1
TABLET ORAL
COMMUNITY
Start: 2021-01-11

## 2024-02-13 NOTE — TELEPHONE ENCOUNTER
Yes the ketoconazole cream will help with the itching.    She can take benadryl pills at night to help with the itching.     If new spots keep appearing we can switch to an oral medication - it would be terbinafine 250mg daily for 4 weeks. I would need to check blood work before we could start it to make sure there are no problems with her liver.

## 2024-02-13 NOTE — TELEPHONE ENCOUNTER
Pt is concerned, she found another spot behind her knee and her rash has become extremly itching , she wants to know if she can scratch her itch ??? Also needs a refill for cream , I returned call to pt to make her aware she has 1 rf on her ketoconozole cr --pt wanted to know if there is something that she can get for the itching or is the ketoconozole for her itch ??it is fkeeping her up at night ..

## 2024-02-19 DIAGNOSIS — Z86.39 PERSONAL HISTORY OF OTHER ENDOCRINE, NUTRITIONAL AND METABOLIC DISEASE: ICD-10-CM

## 2024-02-19 RX ORDER — NALTREXONE HYDROCHLORIDE AND BUPROPION HYDROCHLORIDE 8; 90 MG/1; MG/1
2 TABLET, EXTENDED RELEASE ORAL 2 TIMES DAILY
Qty: 360 TABLET | Refills: 0 | Status: SHIPPED | OUTPATIENT
Start: 2024-02-19

## 2024-02-22 ENCOUNTER — OFFICE VISIT (OUTPATIENT)
Dept: GYNECOLOGIC ONCOLOGY | Facility: CLINIC | Age: 74
End: 2024-02-22
Payer: COMMERCIAL

## 2024-02-22 VITALS
DIASTOLIC BLOOD PRESSURE: 85 MMHG | OXYGEN SATURATION: 100 % | TEMPERATURE: 96.8 F | HEART RATE: 77 BPM | RESPIRATION RATE: 18 BRPM | SYSTOLIC BLOOD PRESSURE: 139 MMHG

## 2024-02-22 DIAGNOSIS — D07.1 VULVAR INTRAEPITHELIAL NEOPLASIA (VIN) GRADE 3: Primary | ICD-10-CM

## 2024-02-22 PROCEDURE — 1159F MED LIST DOCD IN RCRD: CPT | Performed by: NURSE PRACTITIONER

## 2024-02-22 PROCEDURE — 1126F AMNT PAIN NOTED NONE PRSNT: CPT | Performed by: NURSE PRACTITIONER

## 2024-02-22 PROCEDURE — 99213 OFFICE O/P EST LOW 20 MIN: CPT | Performed by: NURSE PRACTITIONER

## 2024-02-22 PROCEDURE — 1036F TOBACCO NON-USER: CPT | Performed by: NURSE PRACTITIONER

## 2024-02-22 PROCEDURE — 3075F SYST BP GE 130 - 139MM HG: CPT | Performed by: NURSE PRACTITIONER

## 2024-02-22 PROCEDURE — 1160F RVW MEDS BY RX/DR IN RCRD: CPT | Performed by: NURSE PRACTITIONER

## 2024-02-22 PROCEDURE — 3079F DIAST BP 80-89 MM HG: CPT | Performed by: NURSE PRACTITIONER

## 2024-02-22 RX ORDER — TERBINAFINE HYDROCHLORIDE 250 MG/1
250 TABLET ORAL DAILY
Qty: 30 TABLET | Refills: 0 | Status: CANCELLED | OUTPATIENT
Start: 2024-02-22

## 2024-02-22 ASSESSMENT — COLUMBIA-SUICIDE SEVERITY RATING SCALE - C-SSRS
6. HAVE YOU EVER DONE ANYTHING, STARTED TO DO ANYTHING, OR PREPARED TO DO ANYTHING TO END YOUR LIFE?: NO
2. HAVE YOU ACTUALLY HAD ANY THOUGHTS OF KILLING YOURSELF?: NO
1. IN THE PAST MONTH, HAVE YOU WISHED YOU WERE DEAD OR WISHED YOU COULD GO TO SLEEP AND NOT WAKE UP?: NO

## 2024-02-22 ASSESSMENT — PATIENT HEALTH QUESTIONNAIRE - PHQ9
2. FEELING DOWN, DEPRESSED OR HOPELESS: NOT AT ALL
1. LITTLE INTEREST OR PLEASURE IN DOING THINGS: NOT AT ALL
SUM OF ALL RESPONSES TO PHQ9 QUESTIONS 1 AND 2: 0

## 2024-02-22 ASSESSMENT — PAIN SCALES - GENERAL: PAINLEVEL: 0-NO PAIN

## 2024-02-22 ASSESSMENT — ENCOUNTER SYMPTOMS
LOSS OF SENSATION IN FEET: 0
DEPRESSION: 0
OCCASIONAL FEELINGS OF UNSTEADINESS: 0

## 2024-02-22 NOTE — PROGRESS NOTES
Patient ID: Joyce Marley is a 73 y.o. female.  Referring Physician: No referring provider defined for this encounter.  Primary Care Provider: Laura Chen MD    Subjective    HPI    Joyce is a 72 year old female with VICKY 2-3 s/p WLE on 1/13/21. Here for 6 month surveillance visit. Patient denies any vaginal bleeding or abnormal discharge. Patient denies any changes in bowel or bladder habits. Appetite has been good. Energy levels are baseline. Patient denies urinary leakage or incontinence. Denies any weight loss or weight gain. Does not report any abdominal pain or bloating. Denies any vulvar lumps or itching. Alternating constipation and diarrhea due to IBS. She saw Dr. Fernández in November 2023, pap was ASCUS, HPV negative. Colposcopy was performed due to history of dysplasia and MAURICE exposure. No acetowhite changes noted per Dr. Hawkins's note. Overall feeling well.     1/13/2021:  WLEV for VICKY-2      Final pathology:    FOCAL HIGH-GRADE SQUAMOUS INTRAEPITHELIAL LESION (VICKY 2-3).     Note: There is a 0.5 mm focus of residual high-grade squamous intraepithelial lesion present adjacent to the previous biopsy  site changes. The peripheral and deep margins are uninvolved; VICKY is 4.5 mm from the closest margin (lateral, A5).   There is no evidence of invasive carcinoma.       Pap 11/2022 (Dr. Hawkins): ASCUS, HPV negative. Colposcopy done due to history of MAURICE exposure, no biopsies indicated.    Pap 11/2023 (Dr. Hawkins): ASCUS, HPV negative. Colposcopy done due to history of MAURICE exposure, no biopsies indicated.    Objective    BSA: There is no height or weight on file to calculate BSA.  /85   Pulse 77   Temp 36 °C (96.8 °F)   Resp 18   SpO2 100%      Physical Exam  Vitals and nursing note reviewed.   Constitutional:       Appearance: Normal appearance.   HENT:      Mouth/Throat:      Mouth: Mucous membranes are moist.      Pharynx: Oropharynx is clear.   Eyes:      Pupils: Pupils  are equal, round, and reactive to light.   Cardiovascular:      Rate and Rhythm: Normal rate and regular rhythm.   Pulmonary:      Effort: Pulmonary effort is normal.      Breath sounds: Normal breath sounds.   Abdominal:      General: Abdomen is flat. There is no distension.      Palpations: Abdomen is soft.      Tenderness: There is no abdominal tenderness.   Genitourinary:     General: Normal vulva.      Vagina: Normal. No bleeding or lesions.      Cervix: No lesion.      Uterus: Normal.       Adnexa: Right adnexa normal and left adnexa normal.        Right: No mass or tenderness.          Left: No mass or tenderness.        Rectum: Normal.      Comments: Smooth vaginal walls. Acetic acid  applied to Vulva with no acetowhite changes or lesions noted. WLE site well healed. Cervix pale pink with some atrophic changes noted, otherwise appears normal with no acetowhite changes.  Musculoskeletal:         General: Normal range of motion.   Lymphadenopathy:      Lower Body: No right inguinal adenopathy. No left inguinal adenopathy.   Skin:     General: Skin is warm and dry.   Neurological:      Mental Status: She is alert.   Psychiatric:         Mood and Affect: Mood normal.         Behavior: Behavior normal.         Performance Status:  Asymptomatic    Assessment/Plan     Joyce is a 73 year old female with VICKY 2-3 s/p WLE on 1/13/21. Here for 6 month surveillance visit.     Plan:    Physical examination was within normal limits today.  She is currently OTONIEL. We reviewed signs and symptoms of possible recurrence with the patient and  she will call our office should she experience any of these.     No acetowhite changes noted on vulva     Follow up in 6 months or sooner if needed     Will see Dr. Hawkins in the fall for pap

## 2024-02-22 NOTE — TELEPHONE ENCOUNTER
I have placed the order for labs. Go to any  lab to get them drawn. When I receive the results I will send the oral pill to her pharmacy

## 2024-05-01 ENCOUNTER — OFFICE VISIT (OUTPATIENT)
Dept: OBSTETRICS AND GYNECOLOGY | Facility: CLINIC | Age: 74
End: 2024-05-01
Payer: COMMERCIAL

## 2024-05-01 VITALS — SYSTOLIC BLOOD PRESSURE: 118 MMHG | DIASTOLIC BLOOD PRESSURE: 76 MMHG

## 2024-05-01 DIAGNOSIS — L72.3 SEBACEOUS CYST: Primary | ICD-10-CM

## 2024-05-01 PROCEDURE — 1036F TOBACCO NON-USER: CPT | Performed by: OBSTETRICS & GYNECOLOGY

## 2024-05-01 PROCEDURE — 1159F MED LIST DOCD IN RCRD: CPT | Performed by: OBSTETRICS & GYNECOLOGY

## 2024-05-01 PROCEDURE — 3074F SYST BP LT 130 MM HG: CPT | Performed by: OBSTETRICS & GYNECOLOGY

## 2024-05-01 PROCEDURE — 99213 OFFICE O/P EST LOW 20 MIN: CPT | Performed by: OBSTETRICS & GYNECOLOGY

## 2024-05-01 PROCEDURE — 3078F DIAST BP <80 MM HG: CPT | Performed by: OBSTETRICS & GYNECOLOGY

## 2024-05-01 PROCEDURE — 1160F RVW MEDS BY RX/DR IN RCRD: CPT | Performed by: OBSTETRICS & GYNECOLOGY

## 2024-05-01 ASSESSMENT — ENCOUNTER SYMPTOMS
CONSTITUTIONAL NEGATIVE: 1
GASTROINTESTINAL NEGATIVE: 1
MUSCULOSKELETAL NEGATIVE: 1
CARDIOVASCULAR NEGATIVE: 1
RESPIRATORY NEGATIVE: 1
PSYCHIATRIC NEGATIVE: 1
NEUROLOGICAL NEGATIVE: 1

## 2024-05-01 NOTE — PROGRESS NOTES
Subjective   Patient ID: Joyce Marley is a 73 y.o. female who presents for BUMP IN VAGINAL AREA.  HPI    Patient presents for vaginal/vulvar bump. She has previous dx of VICKY III s/p resection and sees Gyn Onc every 6 months. She was seen in February without any findings on exam at that time. She states over the last few weeks she noted a small nodular bump in the perineal/para anal region. The nodule is not pruritic, tender, and is not draining. The nodule has not grown since it was first palpated.     Review of Systems   Constitutional: Negative.    Respiratory: Negative.     Cardiovascular: Negative.    Gastrointestinal: Negative.    Genitourinary: Negative.    Musculoskeletal: Negative.    Neurological: Negative.    Psychiatric/Behavioral: Negative.         Objective   Visit Vitals  /76   OB Status Postmenopausal   Smoking Status Never      Physical Exam  Constitutional:       Appearance: Normal appearance.   Pulmonary:      Effort: Pulmonary effort is normal.   Genitourinary:     Comments: Vulva normal in appearance without discoloration, rashes, lesions. Vagina is negative for blood, discharge, lesions. Approximate 2 mm sebaceous cyst visualized at the superior stacey anal skin.   Neurological:      Mental Status: She is alert.   Psychiatric:         Mood and Affect: Mood normal.         Behavior: Behavior normal.         Assessment/Plan   Problem List Items Addressed This Visit    None  Visit Diagnoses         Codes    Sebaceous cyst    -  Primary L72.3    Discussed findings on exam  Discussed sebaceous cysts are common and benign  Precautions given  Gyn Onc visit 3 months, RA 5 months                  Betty Alvarez DO 05/01/24 2:15 PM

## 2024-05-01 NOTE — PATIENT INSTRUCTIONS
Gynecologic Visit for Vaginal or Vulvar cyst:  You were seen in the office today for presence of a cyst of the vulva or vagina  Cysts in the vaginal/vulvar areas are common and generally benign.   Some cysts, such as sebaceous cysts, are best treated with expectant management. Others, such as Bartholin Gland cysts, may require incision and drainage or antibiotics  Even though cysts are generally benign, they can progress to an abscess or cellulitis, especially if attempts at drainage or expression are done under non sterile conditions. It is recommended that you should always be seen in the office if you feel you have a cyst that needs to be drained or is not resolving.   Monitor for signs of abscess including warm and exquisitely tender lump that is growing in short span of time, redness of the vulvar tissue spreading, swelling and or hard/firm texture of the vulvar tissue. If you have any of these signs or symptoms you should be seen in the office for a same day visit or go to the ER.  Please call the office for worsening symptoms, symptoms failing to resolve, or any other concerns. [phone] ([place]) or [phone] ([place])

## 2024-05-31 ENCOUNTER — APPOINTMENT (OUTPATIENT)
Dept: OPHTHALMOLOGY | Facility: CLINIC | Age: 74
End: 2024-05-31
Payer: COMMERCIAL

## 2024-05-31 ENCOUNTER — APPOINTMENT (OUTPATIENT)
Dept: PRIMARY CARE | Facility: CLINIC | Age: 74
End: 2024-05-31
Payer: COMMERCIAL

## 2024-06-03 DIAGNOSIS — I10 ESSENTIAL (PRIMARY) HYPERTENSION: ICD-10-CM

## 2024-06-03 RX ORDER — LOSARTAN POTASSIUM 50 MG/1
TABLET ORAL
Qty: 90 TABLET | Refills: 0 | Status: SHIPPED | OUTPATIENT
Start: 2024-06-03

## 2024-06-06 ENCOUNTER — APPOINTMENT (OUTPATIENT)
Dept: OPHTHALMOLOGY | Facility: CLINIC | Age: 74
End: 2024-06-06
Payer: COMMERCIAL

## 2024-06-26 DIAGNOSIS — Z12.11 COLON CANCER SCREENING: ICD-10-CM

## 2024-06-26 RX ORDER — POLYETHYLENE GLYCOL 3350, SODIUM CHLORIDE, SODIUM BICARBONATE, POTASSIUM CHLORIDE 420; 11.2; 5.72; 1.48 G/4L; G/4L; G/4L; G/4L
POWDER, FOR SOLUTION ORAL
Qty: 4000 ML | Refills: 0 | Status: SHIPPED | OUTPATIENT
Start: 2024-06-26

## 2024-07-30 ENCOUNTER — APPOINTMENT (OUTPATIENT)
Dept: OPHTHALMOLOGY | Facility: CLINIC | Age: 74
End: 2024-07-30
Payer: COMMERCIAL

## 2024-07-30 DIAGNOSIS — H25.813 COMBINED FORMS OF AGE-RELATED CATARACT OF BOTH EYES: ICD-10-CM

## 2024-07-30 DIAGNOSIS — H35.61 RETINAL HEMORRHAGE OF RIGHT EYE: Primary | ICD-10-CM

## 2024-07-30 PROCEDURE — 99213 OFFICE O/P EST LOW 20 MIN: CPT | Performed by: OPTOMETRIST

## 2024-07-30 PROCEDURE — 92134 CPTRZ OPH DX IMG PST SGM RTA: CPT | Mod: RIGHT SIDE | Performed by: OPTOMETRIST

## 2024-07-30 ASSESSMENT — ENCOUNTER SYMPTOMS
CARDIOVASCULAR NEGATIVE: 1
MUSCULOSKELETAL NEGATIVE: 0
PSYCHIATRIC NEGATIVE: 0
ENDOCRINE NEGATIVE: 0
ALLERGIC/IMMUNOLOGIC NEGATIVE: 0
NEUROLOGICAL NEGATIVE: 0
GASTROINTESTINAL NEGATIVE: 0
CONSTITUTIONAL NEGATIVE: 0
EYES NEGATIVE: 1
RESPIRATORY NEGATIVE: 0
HEMATOLOGIC/LYMPHATIC NEGATIVE: 0

## 2024-07-30 ASSESSMENT — CONF VISUAL FIELD
METHOD: COUNTING FINGERS
OS_INFERIOR_NASAL_RESTRICTION: 0
OS_SUPERIOR_NASAL_RESTRICTION: 0
OS_INFERIOR_TEMPORAL_RESTRICTION: 0
OS_NORMAL: 1
OS_SUPERIOR_TEMPORAL_RESTRICTION: 0

## 2024-07-30 ASSESSMENT — VISUAL ACUITY
OD_CC: 20/25
OD_CC+: -2
OS_CC: 20/20
OD_CC: 20/20
METHOD: SNELLEN - LINEAR
OS_CC: 20/20
CORRECTION_TYPE: GLASSES
OS_CC+: -1

## 2024-07-30 ASSESSMENT — SLIT LAMP EXAM - LIDS
COMMENTS: NORMAL
COMMENTS: NORMAL

## 2024-07-30 ASSESSMENT — CUP TO DISC RATIO
OD_RATIO: 0.25
OS_RATIO: 0.25

## 2024-07-30 ASSESSMENT — EXTERNAL EXAM - LEFT EYE: OS_EXAM: NORMAL

## 2024-07-30 ASSESSMENT — TONOMETRY
OS_IOP_MMHG: 16
IOP_METHOD: GOLDMANN APPLANATION
OD_IOP_MMHG: 15

## 2024-07-30 ASSESSMENT — EXTERNAL EXAM - RIGHT EYE: OD_EXAM: NORMAL

## 2024-07-30 NOTE — PROGRESS NOTES
Assessment/Plan   Diagnoses and all orders for this visit:  Retinal hemorrhage of right eye  OCT mac OD. Previous dot blot heme resolved upon examination today. RTC in 1 year for annual exam or sooner if any changes in vision, flashes, floaters or curtain veil noted.

## 2024-08-12 ENCOUNTER — OFFICE VISIT (OUTPATIENT)
Dept: DERMATOLOGY | Facility: HOSPITAL | Age: 74
End: 2024-08-12
Payer: COMMERCIAL

## 2024-08-12 DIAGNOSIS — L64.9 ANDROGENETIC ALOPECIA: ICD-10-CM

## 2024-08-12 PROCEDURE — 1036F TOBACCO NON-USER: CPT | Performed by: DERMATOLOGY

## 2024-08-12 PROCEDURE — 99213 OFFICE O/P EST LOW 20 MIN: CPT | Performed by: DERMATOLOGY

## 2024-08-12 PROCEDURE — 99213 OFFICE O/P EST LOW 20 MIN: CPT | Mod: GC | Performed by: DERMATOLOGY

## 2024-08-12 PROCEDURE — 1160F RVW MEDS BY RX/DR IN RCRD: CPT | Performed by: DERMATOLOGY

## 2024-08-12 PROCEDURE — 1159F MED LIST DOCD IN RCRD: CPT | Performed by: DERMATOLOGY

## 2024-08-12 ASSESSMENT — DERMATOLOGY PATIENT ASSESSMENT
ARE YOU AN ORGAN TRANSPLANT RECIPIENT: NO
ARE YOU TRYING TO GET PREGNANT: NO
HAVE YOU HAD OR DO YOU HAVE A STAPH INFECTION: NO
DO YOU USE SUNSCREEN: OCCASIONALLY
ARE YOU ON BIRTH CONTROL: NO
DO YOU HAVE IRREGULAR MENSTRUAL CYCLES: NO
HAVE YOU HAD OR DO YOU HAVE VASCULAR DISEASE: NO
DO YOU USE A TANNING BED: NO
DO YOU HAVE ANY NEW OR CHANGING LESIONS: NO

## 2024-08-12 ASSESSMENT — ITCH NUMERIC RATING SCALE: HOW SEVERE IS YOUR ITCHING?: 0

## 2024-08-12 ASSESSMENT — DERMATOLOGY QUALITY OF LIFE (QOL) ASSESSMENT
WHAT SINGLE SKIN CONDITION LISTED BELOW IS THE PATIENT ANSWERING THE QUALITY-OF-LIFE ASSESSMENT QUESTIONS ABOUT: ALOPECIA
RATE HOW EMOTIONALLY BOTHERED YOU ARE BY YOUR SKIN PROBLEM (FOR EXAMPLE, WORRY, EMBARRASSMENT, FRUSTRATION): 0 - NEVER BOTHERED
ARE THERE EXCLUSIONS OR EXCEPTIONS FOR THE QUALITY OF LIFE ASSESSMENT: NO
DATE THE QUALITY-OF-LIFE ASSESSMENT WAS COMPLETED: 67064
RATE HOW BOTHERED YOU ARE BY EFFECTS OF YOUR SKIN PROBLEMS ON YOUR ACTIVITIES (EG, GOING OUT, ACCOMPLISHING WHAT YOU WANT, WORK ACTIVITIES OR YOUR RELATIONSHIPS WITH OTHERS): 0 - NEVER BOTHERED
RATE HOW BOTHERED YOU ARE BY SYMPTOMS OF YOUR SKIN PROBLEM (EG, ITCHING, STINGING BURNING, HURTING OR SKIN IRRITATION): 0 - NEVER BOTHERED

## 2024-08-12 ASSESSMENT — PATIENT GLOBAL ASSESSMENT (PGA): PATIENT GLOBAL ASSESSMENT: PATIENT GLOBAL ASSESSMENT:  1 - CLEAR

## 2024-08-12 NOTE — PATIENT INSTRUCTIONS
[facility], [employer], [employer] have the most affordable minoxidil. We recommend minoxidil 5% foam available over the counter at these locations. You can apply this once daily all over the scalp.

## 2024-08-12 NOTE — PROGRESS NOTES
Subjective     Joyce Marley is a 73 y.o. female who presents for the following: Alopecia. Patient seen 2/5/24 with hair loss, she reports she is unsure if the topical minoxidil is helping. She thinks she is still experiencing hair loss/thinning throughout the scalp but that the minoxidil might have kept her from losing as much hair as usual for the past few months. She denies a massive shedding event since last visit. Patient is unsure if she is using the 5% or 2% minoxidil. She has only been applying minoxidil to the right parietal scalp once daily, as she states this is the area that was most concerning for her. Patient is concerned about losing all her hair with age.      Of note 7/12/24 labs from [facility] showed a normal vitamin D level and an elevated TSH. She is getting a repeat thyroid lab panel, denies symptoms currently.        The tinea corporis is resolved with topicals alone, she did not start oral agent        Review of Systems:  No other skin or systemic complaints other than what is documented elsewhere in the note.    The following portions of the chart were reviewed this encounter and updated as appropriate:   Tobacco  Allergies  Meds  Problems  Med Hx  Surg Hx         Skin Cancer History  No skin cancer on file.      Specialty Problems          Dermatology Problems    Melanocytic nevi of trunk        Objective   Well appearing patient in no apparent distress; mood and affect are within normal limits.    A focused skin examination was performed of the scalp. All findings within normal limits unless otherwise noted below.    Assessment/Plan   1. Androgenetic alopecia  Scalp  Decreased hair density in androgenetic areas. On dermoscopy, miniaturized hair follicles are seen and hair shafts of varying diameters are noted. Early hair regrowth noted on left parietal scalp. Negative hair pull test.    - continue with OTC minoxidil (in AVS), counseled patient we recommend the 5% minoxidil  over the counter once daily  - advised patient to apply minoxidil throughout the scalp to help with diffuse andogenetic alopecia  - informed patient to follow up with her thyroid labs with her primary care physician, as she had an elevated TSH 7/12/24 (was previously  - follow up in 6 months and gauge response and see if further work-up and/or treatment are indicated    Related Procedures  Follow Up In Dermatology - Established Patient  Follow Up In Dermatology - Established Patient      Return to clinic for follow up in 6-9 months as needed . She declines scheduling at this time and will call back when needed    Lore Lunsford MD  Department of Dermatology    I saw and evaluated the patient. I personally obtained the key and critical portions of the history and physical exam or was physically present for key and critical portions performed by the resident/fellow. I reviewed the resident/fellow's documentation and discussed the patient with the resident/fellow. I agree with the resident/fellow's medical decision making as documented in the note and made changes where appropriate.    Krista Otoole MD

## 2024-08-29 ENCOUNTER — OFFICE VISIT (OUTPATIENT)
Dept: GYNECOLOGIC ONCOLOGY | Facility: CLINIC | Age: 74
End: 2024-08-29
Payer: COMMERCIAL

## 2024-08-29 VITALS
RESPIRATION RATE: 18 BRPM | TEMPERATURE: 95.4 F | HEART RATE: 68 BPM | DIASTOLIC BLOOD PRESSURE: 68 MMHG | OXYGEN SATURATION: 98 % | SYSTOLIC BLOOD PRESSURE: 107 MMHG

## 2024-08-29 DIAGNOSIS — D07.1 VULVAR INTRAEPITHELIAL NEOPLASIA (VIN) GRADE 3: Primary | ICD-10-CM

## 2024-08-29 PROCEDURE — 1036F TOBACCO NON-USER: CPT | Performed by: NURSE PRACTITIONER

## 2024-08-29 PROCEDURE — 1126F AMNT PAIN NOTED NONE PRSNT: CPT | Performed by: NURSE PRACTITIONER

## 2024-08-29 PROCEDURE — 1159F MED LIST DOCD IN RCRD: CPT | Performed by: NURSE PRACTITIONER

## 2024-08-29 PROCEDURE — 99214 OFFICE O/P EST MOD 30 MIN: CPT | Performed by: NURSE PRACTITIONER

## 2024-08-29 PROCEDURE — 3074F SYST BP LT 130 MM HG: CPT | Performed by: NURSE PRACTITIONER

## 2024-08-29 PROCEDURE — 3078F DIAST BP <80 MM HG: CPT | Performed by: NURSE PRACTITIONER

## 2024-08-29 ASSESSMENT — PAIN SCALES - GENERAL: PAINLEVEL: 0-NO PAIN

## 2024-08-29 NOTE — PROGRESS NOTES
Patient ID: Joyce Marley is a 73 y.o. female.  Referring Physician: No referring provider defined for this encounter.  Primary Care Provider: Mirela Valiente MD MPH    Subjective    HPI    Joyce is a 73 year old female with VICKY 2-3 s/p WLE on 1/13/21. Here for 6 month surveillance visit. Patient denies any vaginal bleeding or abnormal discharge. Patient denies any changes in bowel or bladder habits. Appetite has been good. Energy levels are baseline. Patient denies urinary leakage or incontinence. Denies any weight loss or weight gain. Does not report any abdominal pain or bloating. Denies any vulvar lumps or itching. Alternating constipation and diarrhea due to IBS. She saw Dr. Fernández in November 2023, pap was ASCUS, HPV negative. Colposcopy was performed due to history of dysplasia and MAURICE exposure. No acetowhite changes noted per Dr. Hawkins's note. Overall feeling well.     1/13/2021:  WLEV for VICKY-2      Final pathology:    FOCAL HIGH-GRADE SQUAMOUS INTRAEPITHELIAL LESION (VICKY 2-3).     Note: There is a 0.5 mm focus of residual high-grade squamous intraepithelial lesion present adjacent to the previous biopsy  site changes. The peripheral and deep margins are uninvolved; VICKY is 4.5 mm from the closest margin (lateral, A5).   There is no evidence of invasive carcinoma.       Pap 11/2022 (Dr. Hawkins): ASCUS, HPV negative. Colposcopy done due to history of MAURICE exposure, no biopsies indicated.    Pap 11/2023 (Dr. Hawkins): ASCUS, HPV negative. Colposcopy done due to history of MAURICE exposure, no biopsies indicated.    Objective    BSA: There is no height or weight on file to calculate BSA.  /68   Pulse 68   Temp 35.2 °C (95.4 °F) (Core)   Resp 18   SpO2 98%      Physical Exam  Vitals and nursing note reviewed.   Constitutional:       Appearance: Normal appearance.   HENT:      Mouth/Throat:      Mouth: Mucous membranes are moist.      Pharynx: Oropharynx is clear.   Eyes:      Pupils:  Pupils are equal, round, and reactive to light.   Cardiovascular:      Rate and Rhythm: Normal rate and regular rhythm.   Pulmonary:      Effort: Pulmonary effort is normal.      Breath sounds: Normal breath sounds.   Abdominal:      General: Abdomen is flat. There is no distension.      Palpations: Abdomen is soft.      Tenderness: There is no abdominal tenderness.   Genitourinary:     General: Normal vulva.      Vagina: Normal. No bleeding or lesions.      Cervix: No lesion.      Uterus: Normal.       Adnexa: Right adnexa normal and left adnexa normal.        Right: No mass or tenderness.          Left: No mass or tenderness.        Rectum: Normal.      Comments: Smooth vaginal walls. Acetic acid  applied to Vulva with no acetowhite changes or lesions noted. WLE site well healed. Cervix pale pink with some atrophic changes noted, otherwise appears normal with no acetowhite changes. 2mm sebaceous cyst noted on right perineum   Musculoskeletal:         General: Normal range of motion.   Lymphadenopathy:      Lower Body: No right inguinal adenopathy. No left inguinal adenopathy.   Skin:     General: Skin is warm and dry.   Neurological:      Mental Status: She is alert.   Psychiatric:         Mood and Affect: Mood normal.         Behavior: Behavior normal.         Performance Status:  Asymptomatic    Assessment/Plan     Joyce is a 73 year old female with VICKY 2-3 s/p WLE on 1/13/21. Here for 6 month surveillance visit.     Plan:    Physical examination was within normal limits today.  She is currently OTONIEL. We reviewed signs and symptoms of possible recurrence with the patient and she will call our office should she experience any of these.     No acetowhite changes noted on vulva     Follow up in 6 months or sooner if needed     Will see Dr. Hawkins in the fall for pap

## 2024-09-26 ENCOUNTER — APPOINTMENT (OUTPATIENT)
Dept: PRIMARY CARE | Facility: CLINIC | Age: 74
End: 2024-09-26
Payer: COMMERCIAL

## 2024-09-26 VITALS
OXYGEN SATURATION: 96 % | HEIGHT: 63 IN | BODY MASS INDEX: 26.93 KG/M2 | WEIGHT: 152 LBS | SYSTOLIC BLOOD PRESSURE: 110 MMHG | HEART RATE: 74 BPM | DIASTOLIC BLOOD PRESSURE: 72 MMHG

## 2024-09-26 DIAGNOSIS — I10 ESSENTIAL (PRIMARY) HYPERTENSION: ICD-10-CM

## 2024-09-26 DIAGNOSIS — K21.9 GASTROESOPHAGEAL REFLUX DISEASE, UNSPECIFIED WHETHER ESOPHAGITIS PRESENT: Primary | ICD-10-CM

## 2024-09-26 PROCEDURE — 3008F BODY MASS INDEX DOCD: CPT | Performed by: STUDENT IN AN ORGANIZED HEALTH CARE EDUCATION/TRAINING PROGRAM

## 2024-09-26 PROCEDURE — 1160F RVW MEDS BY RX/DR IN RCRD: CPT | Performed by: STUDENT IN AN ORGANIZED HEALTH CARE EDUCATION/TRAINING PROGRAM

## 2024-09-26 PROCEDURE — 99214 OFFICE O/P EST MOD 30 MIN: CPT | Performed by: STUDENT IN AN ORGANIZED HEALTH CARE EDUCATION/TRAINING PROGRAM

## 2024-09-26 PROCEDURE — 1159F MED LIST DOCD IN RCRD: CPT | Performed by: STUDENT IN AN ORGANIZED HEALTH CARE EDUCATION/TRAINING PROGRAM

## 2024-09-26 PROCEDURE — 3078F DIAST BP <80 MM HG: CPT | Performed by: STUDENT IN AN ORGANIZED HEALTH CARE EDUCATION/TRAINING PROGRAM

## 2024-09-26 PROCEDURE — 1036F TOBACCO NON-USER: CPT | Performed by: STUDENT IN AN ORGANIZED HEALTH CARE EDUCATION/TRAINING PROGRAM

## 2024-09-26 PROCEDURE — 3074F SYST BP LT 130 MM HG: CPT | Performed by: STUDENT IN AN ORGANIZED HEALTH CARE EDUCATION/TRAINING PROGRAM

## 2024-09-26 RX ORDER — LOSARTAN POTASSIUM 50 MG/1
50 TABLET ORAL DAILY
Qty: 90 TABLET | Refills: 3 | Status: SHIPPED | OUTPATIENT
Start: 2024-09-26 | End: 2025-09-26

## 2024-09-26 NOTE — PATIENT INSTRUCTIONS
Thank you for coming today  Joyce!    Please schedule a nurse visit for the flu vaccine at any time.     We recommend getting the COVID vaccine about 3 months after your last COVID illness (sometime late October or early November).     We will get the bone density next year.    I ordered the upper endoscopy and put in the instructions to do the two scopes at the same time. Please call [phone] to confirm this!    I will see you next year or sooner if needed!

## 2024-09-26 NOTE — PROGRESS NOTES
"Subjective   Patient ID: Joyce Marley is a 73 y.o. 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 Previously saw Dr. Rodriguez, last visit Sept 2023.    HPI  Concerns today:  #TSH levels  -Got labs done at [facility]  The patient reports that her TSH levels were abnormal and had repeat testing done, but does not know the results  -I reviewed her [facility] blood work -- TSH, T4, T3, antibodies, lipid panel, CMP, A1c  -Her repeat thyroid studies were within normal limits and reassuring  She is currently asymptomatic and feels well    #GI issues  She has a history of esophageal reflux and irregular bowel movements, sometimes going two to three weeks without a bowel movement. She takes Zantac PRN for her reflux symptoms and has had endoscopies in the past to monitor for Pozo's esophagus.    #HTN  The patient is on Losartan for high blood pressure, which she believes may be higher today due to stress.     #Overweight  She has a history of being overweight and has lost 50 pounds and maintains this with the help of Contrave, which she takes occasionally.    Chronic issues:  Constipation   Cervicalgia - sig pain, crepitus in the neck. No radicular features.   GERD - cimetidine 800 mg daily.      #HM   -screen labs 7.24  -shingrix x2   -cscope 12.2019 - 5 years- due 12.24- repeat with EGD at that time, scheduled for December  -mammo/pap osh gyne, mammo scheduled for October  -tdap 2015   -dexa 9.22   -cac 9.22 zero     Social history:  -   -5 grandchildren, 3 in [place], 2 in [place]  -Never smoking    Family history:  -Heart disease, DM, HTN, stroke    Objective   Visit Vitals  /72   Pulse 74   Ht 1.6 m (5' 3\")   Wt 68.9 kg (152 lb)   SpO2 96%   BMI 26.93 kg/m²   OB Status Postmenopausal   Smoking Status Never   BSA 1.75 m²      Physical Exam  General: Well appearing, conversational, in no acute distress  HEENT: EOMI, PERRL, nares patent without " congestion, MMM  CV: RRR, no murmurs  Resp: Lungs CTAB, normal work of breathing  GI: Soft, nondistended  Ext: No lower ext swelling  Skin: Warm, dry, no rashes  Neuro: Awake, alert, oriented x3, moving all 4 extremities, nonfocal, normal gait, ambulates without assistance  Psych: Appropriate mood and affect      Assessment/Plan   Joyce Marley is a 73 y.o. 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     Overall doing well  We reviewed her blood work from [facility] that was overall reassuring    Will plan to do an upper endoscopy for her GERD symptoms along with her screening colonoscopy in December    She is very plugged in with gynecology for her hx of VICKY    Will refill her losartan today    Discussed vaccine recommendations  -Will plan for flu vaccine in the near future  -Last COVID illness in August, so will get vaccine in Nov    Plan for bone density next year    Problem List Items Addressed This Visit       GERD (gastroesophageal reflux disease) - Primary    Relevant Orders    Esophagogastroduodenoscopy (EGD)     Other Visit Diagnoses       Essential (primary) hypertension        Relevant Medications    losartan (Cozaar) 50 mg tablet                 Mirela Valiente MD MPH

## 2024-10-08 ENCOUNTER — TELEPHONE (OUTPATIENT)
Dept: PRIMARY CARE | Facility: CLINIC | Age: 74
End: 2024-10-08

## 2024-10-14 ENCOUNTER — APPOINTMENT (OUTPATIENT)
Dept: PRIMARY CARE | Facility: CLINIC | Age: 74
End: 2024-10-14
Payer: COMMERCIAL

## 2024-10-14 PROCEDURE — 90662 IIV NO PRSV INCREASED AG IM: CPT | Performed by: STUDENT IN AN ORGANIZED HEALTH CARE EDUCATION/TRAINING PROGRAM

## 2024-10-14 PROCEDURE — 90471 IMMUNIZATION ADMIN: CPT | Performed by: STUDENT IN AN ORGANIZED HEALTH CARE EDUCATION/TRAINING PROGRAM

## 2024-11-08 ENCOUNTER — APPOINTMENT (OUTPATIENT)
Dept: GASTROENTEROLOGY | Facility: EXTERNAL LOCATION | Age: 74
End: 2024-11-08
Payer: COMMERCIAL

## 2024-11-11 ENCOUNTER — APPOINTMENT (OUTPATIENT)
Dept: OBSTETRICS AND GYNECOLOGY | Facility: CLINIC | Age: 74
End: 2024-11-11
Payer: COMMERCIAL

## 2024-11-11 VITALS
SYSTOLIC BLOOD PRESSURE: 132 MMHG | WEIGHT: 154 LBS | HEIGHT: 64 IN | DIASTOLIC BLOOD PRESSURE: 80 MMHG | BODY MASS INDEX: 26.29 KG/M2

## 2024-11-11 DIAGNOSIS — Z12.31 VISIT FOR SCREENING MAMMOGRAM: ICD-10-CM

## 2024-11-11 DIAGNOSIS — R87.610 ASCUS OF CERVIX WITH NEGATIVE HIGH RISK HPV: ICD-10-CM

## 2024-11-11 DIAGNOSIS — Z01.419 WELL WOMAN EXAM: Primary | ICD-10-CM

## 2024-11-11 DIAGNOSIS — Z78.0 MENOPAUSE: ICD-10-CM

## 2024-11-11 PROCEDURE — 87624 HPV HI-RISK TYP POOLED RSLT: CPT

## 2024-11-11 PROCEDURE — 3075F SYST BP GE 130 - 139MM HG: CPT | Performed by: OBSTETRICS & GYNECOLOGY

## 2024-11-11 PROCEDURE — 3008F BODY MASS INDEX DOCD: CPT | Performed by: OBSTETRICS & GYNECOLOGY

## 2024-11-11 PROCEDURE — 1159F MED LIST DOCD IN RCRD: CPT | Performed by: OBSTETRICS & GYNECOLOGY

## 2024-11-11 PROCEDURE — 1036F TOBACCO NON-USER: CPT | Performed by: OBSTETRICS & GYNECOLOGY

## 2024-11-11 PROCEDURE — 88175 CYTOPATH C/V AUTO FLUID REDO: CPT

## 2024-11-11 PROCEDURE — 3079F DIAST BP 80-89 MM HG: CPT | Performed by: OBSTETRICS & GYNECOLOGY

## 2024-11-11 PROCEDURE — 99397 PER PM REEVAL EST PAT 65+ YR: CPT | Performed by: OBSTETRICS & GYNECOLOGY

## 2024-11-11 ASSESSMENT — ENCOUNTER SYMPTOMS
PSYCHIATRIC NEGATIVE: 1
GASTROINTESTINAL NEGATIVE: 1
CONSTITUTIONAL NEGATIVE: 1
MUSCULOSKELETAL NEGATIVE: 1
NEUROLOGICAL NEGATIVE: 1
CARDIOVASCULAR NEGATIVE: 1
RESPIRATORY NEGATIVE: 1

## 2024-11-11 NOTE — PATIENT INSTRUCTIONS
Routine Gynecologic Visit:  You were seen today for a routine gyn visit with normal findings on exam  You were due for a pap smear today. You should still continue to get annual breast and pelvic exams in the office.  An order was placed in the system for mammogram. Please get done at your earliest convenience  If you are having any gynecologic issues in the next year you should call the office. [phone] ([place]) or [phone] ([place])

## 2024-11-11 NOTE — PROGRESS NOTES
"Subjective   Joyce Marley is a 73 y.o. female who presents for annual exam. The patient has no complaints today. The patient is sexually active. GYN screening history: last pap: was abnormal: ASCUS/Neg . The patient is not taking hormone replacement therapy. Patient denies post-menopausal vaginal bleeding.. The patient wears seatbelts: yes. The patient participates in regular exercise: yes. Has the patient ever been transfused or tattooed?: not asked. The patient reports that there is not domestic violence in their life.     Menstrual History:  OB History          2    Para   2    Term   2       0    AB   0    Living   2         SAB   0    IAB   0    Ectopic   0    Multiple   0    Live Births   2                No LMP recorded. Patient is postmenopausal.         Review of Systems   Constitutional: Negative.    Respiratory: Negative.     Cardiovascular: Negative.    Gastrointestinal: Negative.    Genitourinary: Negative.    Musculoskeletal: Negative.    Neurological: Negative.    Psychiatric/Behavioral: Negative.          Objective   /80   Ht 1.613 m (5' 3.5\")   Wt 69.9 kg (154 lb)   BMI 26.85 kg/m²     General:   alert and oriented, in no acute distress   Heart: regular rate and rhythm, S1, S2 normal, no murmur, click, rub or gallop   Lungs: clear to auscultation bilaterally   Abdomen: soft, non-tender, without masses or organomegaly   Pelvic: Vulva normal in appearance without discoloration, rashes, lesions. Urethral meatus normal in appearance, without masses. Vagina is negative for blood, discharge, lesions. Uterus is small, mobile, non tender. There is no cervical motion tenderness, adnexal masses/tenderness. Perineum and anus with normal architecture and without rashes, lesions, discoloration.     Breast: No masses, skin changes, discoloration, tenderness, or nipple drainage bilaterally     Neck: Normal ROM. Thyroid normal size. No masses/tenderness     Lymph: No LAD   Psych: Normal " mood/affect     Lab Review      Assessment/Plan   Problem List Items Addressed This Visit    None  Visit Diagnoses         Codes    Well woman exam    -  Primary Z01.419    ASCUS of cervix with negative high risk HPV     R87.610    Relevant Orders    THINPREP PAP    Visit for screening mammogram     Z12.31    Menopause     Z78.0    Relevant Orders    XR DEXA bone density          1. Pelvic and breast exam wnl  2. Rec for mammogram given, counseled in breast awareness/self exam  3. Pap/cotest pending   4. Colonoscopy up to date  5. Patient asymptomatic without PMB. No HRT/ Osphena. Dexa ordered    RTO 1 year  Betty Alvarez DO

## 2024-11-12 ENCOUNTER — OFFICE VISIT (OUTPATIENT)
Dept: GASTROENTEROLOGY | Facility: HOSPITAL | Age: 74
End: 2024-11-12
Payer: COMMERCIAL

## 2024-11-12 VITALS — HEIGHT: 64 IN | WEIGHT: 154 LBS | BODY MASS INDEX: 26.29 KG/M2

## 2024-11-12 DIAGNOSIS — K58.1 IRRITABLE BOWEL SYNDROME WITH CONSTIPATION: ICD-10-CM

## 2024-11-12 DIAGNOSIS — K59.04 CHRONIC IDIOPATHIC CONSTIPATION: ICD-10-CM

## 2024-11-12 DIAGNOSIS — K21.9 GASTROESOPHAGEAL REFLUX DISEASE WITHOUT ESOPHAGITIS: Primary | ICD-10-CM

## 2024-11-12 PROCEDURE — 3008F BODY MASS INDEX DOCD: CPT | Performed by: INTERNAL MEDICINE

## 2024-11-12 PROCEDURE — 1159F MED LIST DOCD IN RCRD: CPT | Performed by: INTERNAL MEDICINE

## 2024-11-12 PROCEDURE — 99213 OFFICE O/P EST LOW 20 MIN: CPT | Performed by: INTERNAL MEDICINE

## 2024-11-12 PROCEDURE — 99203 OFFICE O/P NEW LOW 30 MIN: CPT | Performed by: INTERNAL MEDICINE

## 2024-11-12 ASSESSMENT — ENCOUNTER SYMPTOMS
RESPIRATORY NEGATIVE: 1
CONSTIPATION: 1
PSYCHIATRIC NEGATIVE: 1
CONSTITUTIONAL NEGATIVE: 1
ALLERGIC/IMMUNOLOGIC NEGATIVE: 1
ENDOCRINE NEGATIVE: 1
HEMATOLOGIC/LYMPHATIC NEGATIVE: 1
EYES NEGATIVE: 1
MUSCULOSKELETAL NEGATIVE: 1
CARDIOVASCULAR NEGATIVE: 1
NEUROLOGICAL NEGATIVE: 1

## 2024-11-12 NOTE — ASSESSMENT & PLAN NOTE
Patient is a 73-year-old with a history of hypertension and chronic acid reflux.  We will pursue upper endoscopy at the same time as colonoscopy.

## 2024-11-12 NOTE — ASSESSMENT & PLAN NOTE
Patient is a 73-year-old with history of hypertension, anxiety and constipation predominant irritable bowel syndrome who had a poor preparation last colonoscopy.  We are pursuing screening colonoscopy due to poor prep.  She will start Linzess 145 mg daily, pursue colonoscopy, and we discussed a 2-day preparation for colonoscopy.  She understands the procedure, sedation recovery.  She is scheduled.

## 2024-11-12 NOTE — PROGRESS NOTES
Patient is aGERD  Constipation predominant irritable bowel syndrome    History of Present Illness:   Joyce Marley is a 73 y.o. female with PMHx of hypertension, constipation, chronic acid reflux, and anxiety and who presents to GI clinic for follow up.  Last seen around 2019 for colonoscopy with poor preparation.  He was recommended repeat in 5 years instead of 10.  She is also having chronic acid reflux symptoms and would like upper endoscopy.  She has been prescribed Linzess but never tried.  She would like to try now and I have prescribed.  She denies dysphagia, rectal bleeding, black stool, or severe abdominal pain.    Her last colonoscopy is reviewed    Review of Systems  Review of Systems   Constitutional: Negative.    HENT: Negative.     Eyes: Negative.    Respiratory: Negative.     Cardiovascular: Negative.    Gastrointestinal:  Positive for constipation.   Endocrine: Negative.    Genitourinary: Negative.    Musculoskeletal: Negative.    Skin: Negative.    Allergic/Immunologic: Negative.    Neurological: Negative.    Hematological: Negative.    Psychiatric/Behavioral: Negative.     All other systems reviewed and are negative.      Allergies  Allergies   Allergen Reactions    Sulfa (Sulfonamide Antibiotics) Hives, Itching, Rash and Swelling    Fluoxetine Hives, Itching, Rash and Swelling       Medications  Current Outpatient Medications   Medication Instructions    cimetidine (Tagamet) 800 mg tablet TAKE 1 TABLET BY MOUTH EVERYDAY AT BEDTIME    LORazepam (Ativan) 0.5 mg tablet Take by mouth.    losartan (COZAAR) 50 mg, oral, Daily        Objective   There were no vitals taken for this visit.   Physical Exam  There is no exam and this will be done at the time of the procedures    Lab Results   Component Value Date    WBC 6.6 05/30/2023    WBC 4.3 (L) 05/24/2022    WBC 4.3 (L) 01/11/2021    HGB 13.9 05/30/2023    HGB 13.4 05/24/2022    HGB 14.6 01/11/2021    HCT 44.9 05/30/2023    HCT 42.2 05/24/2022     HCT 44.5 01/11/2021     05/30/2023     05/24/2022     01/11/2021     Lab Results   Component Value Date     05/30/2023     (H) 05/24/2022     05/12/2021    K 4.4 05/30/2023    K 4.7 05/24/2022    K 4.3 05/12/2021     05/30/2023     05/24/2022     05/12/2021    CO2 30 05/30/2023    CO2 32 05/24/2022    CO2 29 05/12/2021    BUN 9 05/30/2023    BUN 16 05/24/2022    BUN 13 05/12/2021    CREATININE 0.66 05/30/2023    CREATININE 0.64 05/24/2022    CREATININE 0.65 05/12/2021    CALCIUM 9.7 05/30/2023    CALCIUM 9.4 05/24/2022    CALCIUM 9.3 05/12/2021    PROT 6.8 05/30/2023    PROT 6.5 05/24/2022    BILITOT 1.1 05/30/2023    BILITOT 1.3 (H) 05/24/2022    ALKPHOS 56 05/30/2023    ALKPHOS 57 05/24/2022    ALT 19 05/30/2023    ALT 18 05/24/2022    AST 19 05/30/2023    AST 15 05/24/2022    GLUCOSE 99 05/30/2023    GLUCOSE 85 05/24/2022    GLUCOSE 82 05/12/2021           Joyce Marley is a 73 y.o. female who presents to GI clinic for GERD, chronic constipation and due for repeat colonoscopy.    Irritable bowel syndrome with constipation  Patient is a 73-year-old with history of hypertension, anxiety and constipation predominant irritable bowel syndrome who had a poor preparation last colonoscopy.  We are pursuing screening colonoscopy due to poor prep.  She will start Linzess 145 mg daily, pursue colonoscopy, and we discussed a 2-day preparation for colonoscopy.  She understands the procedure, sedation recovery.  She is scheduled.    GERD (gastroesophageal reflux disease)  Patient is a 73-year-old with a history of hypertension and chronic acid reflux.  We will pursue upper endoscopy at the same time as colonoscopy.       Bimal Lam MD

## 2024-11-22 LAB
CYTOLOGY CMNT CVX/VAG CYTO-IMP: NORMAL
HPV HR 12 DNA GENITAL QL NAA+PROBE: NEGATIVE
HPV HR GENOTYPES PNL CVX NAA+PROBE: NEGATIVE
HPV16 DNA SPEC QL NAA+PROBE: NEGATIVE
HPV18 DNA SPEC QL NAA+PROBE: NEGATIVE
LAB AP HPV GENOTYPE QUESTION: YES
LAB AP HPV HR: NORMAL
LAB AP PREVIOUS ABNORMAL HISTORY: NORMAL
LABORATORY COMMENT REPORT: NORMAL
MENSTRUAL HX REPORTED: NORMAL
PATH REPORT.TOTAL CANCER: NORMAL

## 2024-12-16 ENCOUNTER — OFFICE VISIT (OUTPATIENT)
Dept: GASTROENTEROLOGY | Facility: EXTERNAL LOCATION | Age: 74
End: 2024-12-16
Payer: COMMERCIAL

## 2024-12-16 ENCOUNTER — APPOINTMENT (OUTPATIENT)
Dept: GASTROENTEROLOGY | Facility: EXTERNAL LOCATION | Age: 74
End: 2024-12-16
Payer: COMMERCIAL

## 2024-12-16 DIAGNOSIS — K44.9 HH (HIATUS HERNIA): ICD-10-CM

## 2024-12-16 DIAGNOSIS — D12.2 BENIGN NEOPLASM OF ASCENDING COLON: ICD-10-CM

## 2024-12-16 DIAGNOSIS — K21.9 GASTROESOPHAGEAL REFLUX DISEASE, UNSPECIFIED WHETHER ESOPHAGITIS PRESENT: Primary | ICD-10-CM

## 2024-12-16 DIAGNOSIS — Z12.11 COLON CANCER SCREENING: ICD-10-CM

## 2024-12-16 DIAGNOSIS — K57.32 DIVERTICULITIS OF LARGE INTESTINE WITHOUT PERFORATION OR ABSCESS WITHOUT BLEEDING: ICD-10-CM

## 2024-12-16 PROCEDURE — 0753T DGTZ GLS MCRSCP SLD LEVEL IV: CPT

## 2024-12-16 PROCEDURE — 43239 EGD BIOPSY SINGLE/MULTIPLE: CPT | Performed by: INTERNAL MEDICINE

## 2024-12-16 PROCEDURE — 45385 COLONOSCOPY W/LESION REMOVAL: CPT | Performed by: INTERNAL MEDICINE

## 2024-12-16 PROCEDURE — 88305 TISSUE EXAM BY PATHOLOGIST: CPT | Performed by: PATHOLOGY

## 2024-12-16 PROCEDURE — 88305 TISSUE EXAM BY PATHOLOGIST: CPT

## 2024-12-16 RX ORDER — OMEPRAZOLE 20 MG/1
20 CAPSULE, DELAYED RELEASE ORAL
Qty: 90 CAPSULE | Refills: 2 | Status: SHIPPED | OUTPATIENT
Start: 2024-12-16 | End: 2025-12-16

## 2024-12-16 NOTE — PROGRESS NOTES
Patient underwent upper endoscopy showed a 3 cm hiatal hernia, gastritis and reflux esophagitis.  I think she should stop Tagamet and use Prilosec 20 mg daily.    Colonoscopy showed 1 polyp and if the polyps and adenoma repeat 5 years.

## 2024-12-17 ENCOUNTER — LAB REQUISITION (OUTPATIENT)
Dept: LAB | Facility: HOSPITAL | Age: 74
End: 2024-12-17
Payer: COMMERCIAL

## 2024-12-17 DIAGNOSIS — K57.32 DIVERTICULITIS OF LARGE INTESTINE WITHOUT PERFORATION OR ABSCESS WITHOUT BLEEDING: ICD-10-CM

## 2024-12-19 ENCOUNTER — TELEPHONE (OUTPATIENT)
Dept: GASTROENTEROLOGY | Facility: CLINIC | Age: 74
End: 2024-12-19
Payer: COMMERCIAL

## 2024-12-19 NOTE — TELEPHONE ENCOUNTER
Patient has a little laryngitis after the endoscopy.  At the endoscopy it was uneventful and the next day she had no issues of laryngitis or any cold symptoms but then developed this afterwards.  I reinforced that this is likely related to a viral illness and to use symptomatic therapy.  She is comfortable with this approach.

## 2024-12-24 LAB
LABORATORY COMMENT REPORT: NORMAL
PATH REPORT.FINAL DX SPEC: NORMAL
PATH REPORT.GROSS SPEC: NORMAL
PATH REPORT.RELEVANT HX SPEC: NORMAL
PATH REPORT.TOTAL CANCER: NORMAL

## 2025-01-10 ENCOUNTER — APPOINTMENT (OUTPATIENT)
Dept: RADIOLOGY | Facility: CLINIC | Age: 75
End: 2025-01-10
Payer: COMMERCIAL

## 2025-02-03 ENCOUNTER — APPOINTMENT (OUTPATIENT)
Dept: OPHTHALMOLOGY | Facility: CLINIC | Age: 75
End: 2025-02-03
Payer: COMMERCIAL

## 2025-02-03 DIAGNOSIS — H25.813 COMBINED FORMS OF AGE-RELATED CATARACT OF BOTH EYES: Primary | ICD-10-CM

## 2025-02-03 DIAGNOSIS — H35.61 RETINAL HEMORRHAGE OF RIGHT EYE: ICD-10-CM

## 2025-02-03 PROCEDURE — 99214 OFFICE O/P EST MOD 30 MIN: CPT | Performed by: OPTOMETRIST

## 2025-02-03 ASSESSMENT — REFRACTION_WEARINGRX
OD_AXIS: 026
OS_SPHERE: +1.75
OD_SPHERE: +1.50
OD_SPHERE: +0.25
OD_AXIS: 026
OD_CYLINDER: +0.75
OD_ADD: +1.50
OS_CYLINDER: +0.75
OS_ADD: +1.50
OD_ADD: +2.75
OS_SPHERE: +0.50
OS_CYLINDER: +0.75
OS_AXIS: 158
OD_CYLINDER: +0.75
OS_ADD: +2.75
OS_AXIS: 158

## 2025-02-03 ASSESSMENT — ENCOUNTER SYMPTOMS
EYES NEGATIVE: 1
PSYCHIATRIC NEGATIVE: 0
NEUROLOGICAL NEGATIVE: 0
ENDOCRINE NEGATIVE: 0
CARDIOVASCULAR NEGATIVE: 0
CONSTITUTIONAL NEGATIVE: 0
ALLERGIC/IMMUNOLOGIC NEGATIVE: 0
GASTROINTESTINAL NEGATIVE: 0
MUSCULOSKELETAL NEGATIVE: 0
RESPIRATORY NEGATIVE: 0
HEMATOLOGIC/LYMPHATIC NEGATIVE: 0

## 2025-02-03 ASSESSMENT — CONF VISUAL FIELD
METHOD: COUNTING FINGERS
OD_NORMAL: 1
OD_INFERIOR_TEMPORAL_RESTRICTION: 0
OS_SUPERIOR_TEMPORAL_RESTRICTION: 0
OS_SUPERIOR_NASAL_RESTRICTION: 0
OS_NORMAL: 1
OD_SUPERIOR_NASAL_RESTRICTION: 0
OS_INFERIOR_NASAL_RESTRICTION: 0
OS_INFERIOR_TEMPORAL_RESTRICTION: 0
OD_INFERIOR_NASAL_RESTRICTION: 0
OD_SUPERIOR_TEMPORAL_RESTRICTION: 0

## 2025-02-03 ASSESSMENT — VISUAL ACUITY
OS_PH_CC: 20/20
OD_PH_CC: 20/20
METHOD: SNELLEN - LINEAR
OS_CC: 20/20
OD_CC: 20/25
OD_CC+: -2
CORRECTION_TYPE: GLASSES

## 2025-02-03 ASSESSMENT — TONOMETRY
OS_IOP_MMHG: 15
OD_IOP_MMHG: 15
IOP_METHOD: GOLDMANN APPLANATION

## 2025-02-03 ASSESSMENT — EXTERNAL EXAM - RIGHT EYE: OD_EXAM: NORMAL

## 2025-02-03 ASSESSMENT — SLIT LAMP EXAM - LIDS
COMMENTS: NORMAL
COMMENTS: NORMAL

## 2025-02-03 ASSESSMENT — CUP TO DISC RATIO
OS_RATIO: 0.25
OD_RATIO: 0.25

## 2025-02-03 ASSESSMENT — EXTERNAL EXAM - LEFT EYE: OS_EXAM: NORMAL

## 2025-02-03 NOTE — PROGRESS NOTES
Assessment/Plan   Diagnoses and all orders for this visit:  Combined forms of age-related cataract of both eyes  Patient's cataracts are not visually significant. Will monitor for changes. No indication for surgery at this time.    Retinal hemorrhage of right eye  Heme remains resolved. No new hemes present on exam. Recommend monitor in 1 year w/ full exam with DFE and option for refraction.  RTC w/ new/worsening RSVP, flashes, floaters.

## 2025-02-07 ENCOUNTER — HOSPITAL ENCOUNTER (OUTPATIENT)
Dept: RADIOLOGY | Facility: CLINIC | Age: 75
Discharge: HOME | End: 2025-02-07
Payer: COMMERCIAL

## 2025-02-07 DIAGNOSIS — Z78.0 MENOPAUSE: ICD-10-CM

## 2025-02-07 DIAGNOSIS — M85.852 OSTEOPENIA OF NECK OF LEFT FEMUR: Primary | ICD-10-CM

## 2025-02-07 PROCEDURE — 77080 DXA BONE DENSITY AXIAL: CPT

## 2025-02-07 RX ORDER — IBANDRONATE SODIUM 150 MG/1
150 TABLET, FILM COATED ORAL
Qty: 1 TABLET | Refills: 11 | Status: SHIPPED | OUTPATIENT
Start: 2025-02-07 | End: 2026-02-07

## 2025-02-12 ENCOUNTER — APPOINTMENT (OUTPATIENT)
Dept: DERMATOLOGY | Facility: CLINIC | Age: 75
End: 2025-02-12
Payer: COMMERCIAL

## 2025-02-12 DIAGNOSIS — L20.89 FLEXURAL ATOPIC DERMATITIS: Primary | ICD-10-CM

## 2025-02-12 DIAGNOSIS — L64.9 ANDROGENETIC ALOPECIA: ICD-10-CM

## 2025-02-12 LAB — POC KOH - DERM RESULT 1: NEGATIVE

## 2025-02-12 PROCEDURE — 1036F TOBACCO NON-USER: CPT | Performed by: DERMATOLOGY

## 2025-02-12 PROCEDURE — 87220 TISSUE EXAM FOR FUNGI: CPT | Performed by: DERMATOLOGY

## 2025-02-12 PROCEDURE — 1160F RVW MEDS BY RX/DR IN RCRD: CPT | Performed by: DERMATOLOGY

## 2025-02-12 PROCEDURE — 1159F MED LIST DOCD IN RCRD: CPT | Performed by: DERMATOLOGY

## 2025-02-12 PROCEDURE — 99214 OFFICE O/P EST MOD 30 MIN: CPT | Performed by: DERMATOLOGY

## 2025-02-12 RX ORDER — TRIAMCINOLONE ACETONIDE 1 MG/G
CREAM TOPICAL 2 TIMES DAILY
Qty: 80 G | Refills: 2 | Status: SHIPPED | OUTPATIENT
Start: 2025-02-12

## 2025-02-12 ASSESSMENT — PATIENT GLOBAL ASSESSMENT (PGA): PATIENT GLOBAL ASSESSMENT: PATIENT GLOBAL ASSESSMENT:  1 - CLEAR

## 2025-02-12 ASSESSMENT — DERMATOLOGY PATIENT ASSESSMENT
ARE YOU AN ORGAN TRANSPLANT RECIPIENT: NO
HAVE YOU HAD OR DO YOU HAVE VASCULAR DISEASE: NO
DO YOU USE A TANNING BED: NO
HAVE YOU HAD OR DO YOU HAVE A STAPH INFECTION: NO
ARE YOU TRYING TO GET PREGNANT: NO
DO YOU HAVE ANY NEW OR CHANGING LESIONS: NO
DO YOU USE SUNSCREEN: OCCASIONALLY
DO YOU HAVE IRREGULAR MENSTRUAL CYCLES: NO
ARE YOU ON BIRTH CONTROL: NO

## 2025-02-12 ASSESSMENT — DERMATOLOGY QUALITY OF LIFE (QOL) ASSESSMENT
WHAT SINGLE SKIN CONDITION LISTED BELOW IS THE PATIENT ANSWERING THE QUALITY-OF-LIFE ASSESSMENT QUESTIONS ABOUT: ALOPECIA
RATE HOW BOTHERED YOU ARE BY SYMPTOMS OF YOUR SKIN PROBLEM (EG, ITCHING, STINGING BURNING, HURTING OR SKIN IRRITATION): 0 - NEVER BOTHERED
RATE HOW BOTHERED YOU ARE BY EFFECTS OF YOUR SKIN PROBLEMS ON YOUR ACTIVITIES (EG, GOING OUT, ACCOMPLISHING WHAT YOU WANT, WORK ACTIVITIES OR YOUR RELATIONSHIPS WITH OTHERS): 0 - NEVER BOTHERED
DATE THE QUALITY-OF-LIFE ASSESSMENT WAS COMPLETED: 67248
RATE HOW EMOTIONALLY BOTHERED YOU ARE BY YOUR SKIN PROBLEM (FOR EXAMPLE, WORRY, EMBARRASSMENT, FRUSTRATION): 0 - NEVER BOTHERED
ARE THERE EXCLUSIONS OR EXCEPTIONS FOR THE QUALITY OF LIFE ASSESSMENT: NO

## 2025-02-12 ASSESSMENT — ITCH NUMERIC RATING SCALE: HOW SEVERE IS YOUR ITCHING?: 0

## 2025-02-12 NOTE — PROGRESS NOTES
Subjective     Joyce Marley is a 74 y.o. female who presents for the following: Alopecia. Last seen 8/12/24 for androgenetic alopecia. At that visit, patient was started on topical minoxidil 5%. She reports that she completed a short trial of this medication, but stopped as she was concerned it was causing nausea. She is very hesitant about starting oral medications as she does not want to be on any medications for an extended period of time.     Additionally, patient concerned about a new rash on her left arm. She reports lesions are extremely pruritic and in the same area as her previous fungal infection which resolved. Of note, patent previously diagnosed (2/5/24) with tinea corporis (positive KOH) which resolved with use of ketoconazole. She has not currently used any topical/oral treatments.     Review of Systems:  No other skin or systemic complaints other than what is documented elsewhere in the note.    The following portions of the chart were reviewed this encounter and updated as appropriate:   Tobacco  Allergies  Meds  Problems  Med Hx  Surg Hx         Skin Cancer History  No skin cancer on file.      Specialty Problems          Dermatology Problems    Melanocytic nevi of trunk        Objective   Well appearing patient in no apparent distress; mood and affect are within normal limits.    A focused skin examination was performed. All findings within normal limits unless otherwise noted below.    Assessment/Plan   1. Flexural atopic dermatitis  Left Antecubital Fossa  On the left antecubital fossa, there are two erythematous scaly plaques.     Favor atopic dermatitis based on exam today  -Patient notes previous history of tinea corporis  -KOH today performed in office and was negative.   -Will treat with topical triamcinolone 0.1% cream BID x 2 weeks. Discussed the risks of topical steroid overuse including atrophy, telangiectasias, and striae.     POCT KOH Prep - DERM Manually Resulted - Left  Antecubital Fossa    Related Medications  triamcinolone (Kenalog) 0.1 % cream  Apply topically 2 times a day. For 2 weeks.    2. Androgenetic alopecia  Scalp  Decreased hair density in androgenetic areas. On dermoscopy, miniaturized hair follicles are seen and hair shafts of varying diameters are noted.  Negative hair pull test.    Androgenetic Alopecia - Stable from previous visit   - Discussed with patient that the goal is to maintain hair and prevent further hair loss.   - Thyroid studies 8/2024 within normal limits   - Discussed other alopecia treatments including hair max red light comb. Patient wishes to avoid any oral medications as she does not want to be on any medications long term. After discussion, patient wishes to re-trial topical minoxidil to see if it was the cause of her nausea.   - Restart OTC minoxidil. Counseled patient we recommend the 5% minoxidil over the counter once daily  - Advised patient to apply minoxidil throughout the scalp to help with diffuse andogenetic alopecia          RTC as needed. Patient will call if she would like to schedule follow up for alopecia.     Meenu Dee,      I saw and evaluated the patient. I personally obtained the key and critical portions of the history and physical exam or was physically present for key and critical portions performed by the resident/fellow. I reviewed the resident/fellow's documentation and discussed the patient with the resident/fellow. I agree with the resident/fellow's medical decision making as documented in the note and made changes where appropriate.    Krista Otoole MD

## 2025-03-06 ENCOUNTER — APPOINTMENT (OUTPATIENT)
Dept: OPHTHALMOLOGY | Facility: CLINIC | Age: 75
End: 2025-03-06
Payer: COMMERCIAL

## 2025-03-06 ENCOUNTER — OFFICE VISIT (OUTPATIENT)
Dept: GYNECOLOGIC ONCOLOGY | Facility: CLINIC | Age: 75
End: 2025-03-06
Payer: COMMERCIAL

## 2025-03-06 VITALS
DIASTOLIC BLOOD PRESSURE: 77 MMHG | TEMPERATURE: 98.2 F | HEART RATE: 69 BPM | RESPIRATION RATE: 18 BRPM | OXYGEN SATURATION: 98 % | SYSTOLIC BLOOD PRESSURE: 131 MMHG

## 2025-03-06 DIAGNOSIS — H43.811 PVD (POSTERIOR VITREOUS DETACHMENT), RIGHT EYE: Primary | ICD-10-CM

## 2025-03-06 DIAGNOSIS — H43.391 VITREOUS FLOATERS OF RIGHT EYE: ICD-10-CM

## 2025-03-06 DIAGNOSIS — D07.1 VULVAR INTRAEPITHELIAL NEOPLASIA (VIN) GRADE 3: Primary | ICD-10-CM

## 2025-03-06 PROCEDURE — 99214 OFFICE O/P EST MOD 30 MIN: CPT | Performed by: NURSE PRACTITIONER

## 2025-03-06 PROCEDURE — 3078F DIAST BP <80 MM HG: CPT | Performed by: NURSE PRACTITIONER

## 2025-03-06 PROCEDURE — 99214 OFFICE O/P EST MOD 30 MIN: CPT | Performed by: OPTOMETRIST

## 2025-03-06 PROCEDURE — 3075F SYST BP GE 130 - 139MM HG: CPT | Performed by: NURSE PRACTITIONER

## 2025-03-06 PROCEDURE — 1159F MED LIST DOCD IN RCRD: CPT | Performed by: NURSE PRACTITIONER

## 2025-03-06 PROCEDURE — 1126F AMNT PAIN NOTED NONE PRSNT: CPT | Performed by: NURSE PRACTITIONER

## 2025-03-06 ASSESSMENT — CONF VISUAL FIELD
OD_INFERIOR_NASAL_RESTRICTION: 0
OS_SUPERIOR_NASAL_RESTRICTION: 0
OS_NORMAL: 1
OD_SUPERIOR_NASAL_RESTRICTION: 0
OS_INFERIOR_TEMPORAL_RESTRICTION: 0
OD_INFERIOR_TEMPORAL_RESTRICTION: 0
METHOD: COUNTING FINGERS
OD_SUPERIOR_TEMPORAL_RESTRICTION: 0
OD_NORMAL: 1
OS_INFERIOR_NASAL_RESTRICTION: 0
OS_SUPERIOR_TEMPORAL_RESTRICTION: 0

## 2025-03-06 ASSESSMENT — ENCOUNTER SYMPTOMS
CARDIOVASCULAR NEGATIVE: 0
ALLERGIC/IMMUNOLOGIC NEGATIVE: 0
ENDOCRINE NEGATIVE: 0
CONSTITUTIONAL NEGATIVE: 0
GASTROINTESTINAL NEGATIVE: 0
EYES NEGATIVE: 0
PSYCHIATRIC NEGATIVE: 0
HEMATOLOGIC/LYMPHATIC NEGATIVE: 0
MUSCULOSKELETAL NEGATIVE: 0
RESPIRATORY NEGATIVE: 0
NEUROLOGICAL NEGATIVE: 0

## 2025-03-06 ASSESSMENT — TONOMETRY
OD_IOP_MMHG: 16
IOP_METHOD: GOLDMANN APPLANATION
OS_IOP_MMHG: 14

## 2025-03-06 ASSESSMENT — CUP TO DISC RATIO
OS_RATIO: 0.25
OD_RATIO: 0.25

## 2025-03-06 ASSESSMENT — VISUAL ACUITY
OD_CC+: +2
METHOD: SNELLEN - LINEAR
OD_CC: 20/30
OS_CC: 20/25
CORRECTION_TYPE: GLASSES

## 2025-03-06 ASSESSMENT — EXTERNAL EXAM - LEFT EYE: OS_EXAM: NORMAL

## 2025-03-06 ASSESSMENT — SLIT LAMP EXAM - LIDS
COMMENTS: NORMAL
COMMENTS: NORMAL

## 2025-03-06 ASSESSMENT — PAIN SCALES - GENERAL: PAINLEVEL_OUTOF10: 0-NO PAIN

## 2025-03-06 ASSESSMENT — EXTERNAL EXAM - RIGHT EYE: OD_EXAM: NORMAL

## 2025-03-06 NOTE — PROGRESS NOTES
Patient ID: Joyce Marley is a 74 y.o. female.  Referring Physician: No referring provider defined for this encounter.  Primary Care Provider: Mirela Valiente MD MPH    Subjective    HPI    Joyce is a 73 year old female with VICKY 2-3 s/p WLE on 1/13/21. Here for 6 month surveillance visit. Patient denies any vaginal bleeding or abnormal discharge. Patient denies any changes in bowel or bladder habits. Appetite has been good. Energy levels are baseline. Patient denies urinary leakage or incontinence. Denies any weight loss or weight gain. Does not report any abdominal pain or bloating. Denies any vulvar lumps or itching. Alternating constipation and diarrhea due to IBS. She saw Dr. Fernández in November 2023, pap was ASCUS, HPV negative. Colposcopy was performed due to history of dysplasia and MAURICE exposure. No acetowhite changes noted per Dr. Hawkins's note. Overall feeling well.     1/13/2021:  WLEV for VICKY-2      Final pathology:    FOCAL HIGH-GRADE SQUAMOUS INTRAEPITHELIAL LESION (VICKY 2-3).     Note: There is a 0.5 mm focus of residual high-grade squamous intraepithelial lesion present adjacent to the previous biopsy  site changes. The peripheral and deep margins are uninvolved; VICKY is 4.5 mm from the closest margin (lateral, A5).   There is no evidence of invasive carcinoma.       Pap 11/2022 (Dr. Hawkins): ASCUS, HPV negative. Colposcopy done due to history of MAURICE exposure, no biopsies indicated.    Pap 11/2023 (Dr. Hawkins): ASCUS, HPV negative. Colposcopy done due to history of MAURICE exposure, no biopsies indicated.    Pap 11/2024: neg/neg    Objective    BSA: There is no height or weight on file to calculate BSA.  /77   Pulse 69   Temp 36.8 °C (98.2 °F)   Resp 18   SpO2 98%      Physical Exam  Vitals and nursing note reviewed.   Constitutional:       Appearance: Normal appearance.   HENT:      Mouth/Throat:      Mouth: Mucous membranes are moist.      Pharynx: Oropharynx is clear.   Eyes:       Pupils: Pupils are equal, round, and reactive to light.   Cardiovascular:      Rate and Rhythm: Normal rate and regular rhythm.   Pulmonary:      Effort: Pulmonary effort is normal.      Breath sounds: Normal breath sounds.   Abdominal:      General: Abdomen is flat. There is no distension.      Palpations: Abdomen is soft.      Tenderness: There is no abdominal tenderness.   Genitourinary:     General: Normal vulva.      Vagina: Normal. No bleeding or lesions.      Cervix: No lesion.      Uterus: Normal.       Adnexa: Right adnexa normal and left adnexa normal.        Right: No mass or tenderness.          Left: No mass or tenderness.        Rectum: Normal.      Comments: Smooth vaginal walls. Acetic acid  applied to Vulva with no acetowhite changes or lesions noted. WLE site well healed. Cervix pale pink with some atrophic changes noted, otherwise appears normal with no acetowhite changes.  Musculoskeletal:         General: Normal range of motion.   Lymphadenopathy:      Lower Body: No right inguinal adenopathy. No left inguinal adenopathy.   Skin:     General: Skin is warm and dry.   Neurological:      Mental Status: She is alert.   Psychiatric:         Mood and Affect: Mood normal.         Behavior: Behavior normal.         Performance Status:  Asymptomatic    Assessment/Plan     oJyce is a 74 year old female with VICKY 2-3 s/p WLE on 1/13/21. Here for 6 month surveillance visit.     Plan:    Physical examination was within normal limits today.  She is currently OTONIEL. We reviewed signs and symptoms of possible recurrence with the patient and she will call our office should she experience any of these.     No acetowhite changes noted on vulva     Follow up in 6 months or sooner if needed     Will see Dr. Hawkins in the fall for pap

## 2025-03-06 NOTE — PROGRESS NOTES
Assessment/Plan   Diagnoses and all orders for this visit:  PVD (posterior vitreous detachment), right eye  The patient was advised that he/she has a vitreous detachment.  As we age the gel inside the eye shrinks, and moves forward away from the retina. The fibrous ring that used to anchor the gel to the back of the eye is now floating freely inside the eye and is seen as a large floater. Other optical imperfections may also be visible.  This floater will never go away but will become less noticeable with time. The floater will be apparent most often while reading and in bright light. Also as this gel shrinks it can tug on the retina causing the patient to see flashes of light. The concern is that as it tugs it may tear the retina leading to a retinal detachment. No retinal tears have been seen today. The patient is to follow-up in 8wks for another dilated exam.  The patient is at increased risk for a retinal tear or detachment.  The patient has been told that if they notice an increase in number or frequency of floaters or flashes or if the patient notices a curtain falling over any part of their vision this could indicate a problem with the retina that could require immediate treatment.  The patient is to call the office ASAP if the listed symptoms occur.  RTC 6 weeks for DFE OD only    Vitreous floaters of right eye  -     OCT, Retina - OU - Both Eyes

## 2025-03-06 NOTE — Clinical Note
Lupillo Mosley,  Could you call this patient between 11am and 1pm today to schedule her a follow up for DFE in about 6ish week? She is ok going to [place] or [facility].  Thanks! -SH

## 2025-04-15 ENCOUNTER — APPOINTMENT (OUTPATIENT)
Dept: OPHTHALMOLOGY | Facility: CLINIC | Age: 75
End: 2025-04-15
Payer: COMMERCIAL

## 2025-04-15 DIAGNOSIS — H43.391 VITREOUS FLOATERS OF RIGHT EYE: ICD-10-CM

## 2025-04-15 DIAGNOSIS — H43.811 PVD (POSTERIOR VITREOUS DETACHMENT), RIGHT EYE: Primary | ICD-10-CM

## 2025-04-15 PROCEDURE — 99213 OFFICE O/P EST LOW 20 MIN: CPT | Performed by: OPTOMETRIST

## 2025-04-15 ASSESSMENT — REFRACTION_WEARINGRX
OS_ADD: +1.50
OD_AXIS: 026
OD_SPHERE: +0.25
OS_AXIS: 158
OS_AXIS: 158
OD_ADD: +2.75
OD_CYLINDER: +0.75
OS_CYLINDER: +0.75
OD_SPHERE: +1.50
OS_SPHERE: +1.75
OD_CYLINDER: +0.75
OS_SPHERE: +0.50
OD_ADD: +1.50
OD_AXIS: 026
OS_CYLINDER: +0.75
OS_ADD: +2.75

## 2025-04-15 ASSESSMENT — CONF VISUAL FIELD
OS_INFERIOR_TEMPORAL_RESTRICTION: 0
OD_SUPERIOR_NASAL_RESTRICTION: 0
OD_SUPERIOR_TEMPORAL_RESTRICTION: 0
OD_INFERIOR_NASAL_RESTRICTION: 0
OS_SUPERIOR_TEMPORAL_RESTRICTION: 0
METHOD: COUNTING FINGERS
OD_INFERIOR_TEMPORAL_RESTRICTION: 0
OS_INFERIOR_NASAL_RESTRICTION: 0
OD_NORMAL: 1
OS_NORMAL: 1
OS_SUPERIOR_NASAL_RESTRICTION: 0

## 2025-04-15 ASSESSMENT — SLIT LAMP EXAM - LIDS
COMMENTS: NORMAL
COMMENTS: NORMAL

## 2025-04-15 ASSESSMENT — ENCOUNTER SYMPTOMS
NEUROLOGICAL NEGATIVE: 0
GASTROINTESTINAL NEGATIVE: 0
CARDIOVASCULAR NEGATIVE: 0
RESPIRATORY NEGATIVE: 0
EYES NEGATIVE: 1
PSYCHIATRIC NEGATIVE: 0
HEMATOLOGIC/LYMPHATIC NEGATIVE: 0
CONSTITUTIONAL NEGATIVE: 0
ENDOCRINE NEGATIVE: 0
ALLERGIC/IMMUNOLOGIC NEGATIVE: 0
MUSCULOSKELETAL NEGATIVE: 0

## 2025-04-15 ASSESSMENT — VISUAL ACUITY
OS_CC+: -2
METHOD: SNELLEN - LINEAR
OD_CC+: -1
OD_CC: 20/20
OS_CC: 20/20
CORRECTION_TYPE: GLASSES

## 2025-04-15 ASSESSMENT — EXTERNAL EXAM - LEFT EYE: OS_EXAM: NORMAL

## 2025-04-15 ASSESSMENT — TONOMETRY
OD_IOP_MMHG: 14
IOP_METHOD: GOLDMANN APPLANATION

## 2025-04-15 ASSESSMENT — EXTERNAL EXAM - RIGHT EYE: OD_EXAM: NORMAL

## 2025-04-15 ASSESSMENT — CUP TO DISC RATIO
OS_RATIO: 0.25
OD_RATIO: 0.25

## 2025-04-16 NOTE — PROGRESS NOTES
Assessment/Plan   Diagnoses and all orders for this visit:  PVD (posterior vitreous detachment), right eye  Vitreous floaters of right eye  The patient was advised that he/she has a vitreous detachment.  As we age the gel inside the eye shrinks, and moves forward away from the retina. The fibrous ring that used to anchor the gel to the back of the eye is now floating freely inside the eye and is seen as a large floater. Other optical imperfections may also be visible.  This floater will never go away but will become less noticeable with time. The floater will be apparent most often while reading and in bright light. Also as this gel shrinks it can tug on the retina causing the patient to see flashes of light. The concern is that as it tugs it may tear the retina leading to a retinal detachment. No retinal tears have been seen today.  The patient is at increased risk for a retinal tear or detachment. The patient has been told that if they notice an increase in number or frequency of floaters or flashes or if the patient notices a curtain falling over any part of their vision this could indicate a problem with the retina that could require immediate treatment.  The patient is to call the office ASAP if the listed symptoms occur. Recommend dilate in 1 year.

## 2025-04-29 ENCOUNTER — OFFICE VISIT (OUTPATIENT)
Dept: GASTROENTEROLOGY | Facility: HOSPITAL | Age: 75
End: 2025-04-29
Payer: COMMERCIAL

## 2025-04-29 VITALS — HEIGHT: 64 IN | WEIGHT: 153 LBS | BODY MASS INDEX: 26.12 KG/M2

## 2025-04-29 DIAGNOSIS — K21.9 GASTROESOPHAGEAL REFLUX DISEASE WITHOUT ESOPHAGITIS: ICD-10-CM

## 2025-04-29 DIAGNOSIS — K59.04 CHRONIC IDIOPATHIC CONSTIPATION: Primary | ICD-10-CM

## 2025-04-29 DIAGNOSIS — K44.9 HIATAL HERNIA: ICD-10-CM

## 2025-04-29 PROCEDURE — 99214 OFFICE O/P EST MOD 30 MIN: CPT | Performed by: INTERNAL MEDICINE

## 2025-04-29 PROCEDURE — 1159F MED LIST DOCD IN RCRD: CPT | Performed by: INTERNAL MEDICINE

## 2025-04-29 PROCEDURE — 3008F BODY MASS INDEX DOCD: CPT | Performed by: INTERNAL MEDICINE

## 2025-04-29 RX ORDER — LUBIPROSTONE 8 UG/1
8 CAPSULE ORAL
Qty: 60 CAPSULE | Refills: 11 | Status: SHIPPED | OUTPATIENT
Start: 2025-04-29 | End: 2026-04-29

## 2025-04-29 ASSESSMENT — ENCOUNTER SYMPTOMS
CONSTIPATION: 1
NAUSEA: 1

## 2025-04-29 NOTE — PROGRESS NOTES
Chronic constipation  Gastroesophageal reflux  Hiatal hernia    History of Present Illness:   Joyce Marley is a 74 y.o. female with PMHx of anxiety, hypertension, chronic constipation, hiatal hernia, adenomatous polyps, and reflux esophagitis and who presents to GI clinic for follow up.  Last seen around 2024 for colonoscopy and endoscopy.  She has 2 complaints 1 is does she need a proton pump inhibitor for reflux esophagitis with a history of a bone density showing osteopenia.  She also has chronic constipation.  She denies rectal bleeding, dysphagia, weight loss, abdominal pain or fever.    Her bone density is reviewed    I further reviewed her endoscopy and colonoscopy    I have also reviewed her medicines    Review of Systems  Review of Systems   Gastrointestinal:  Positive for constipation and nausea.       Allergies  RX Allergies[1]    Medications  Current Outpatient Medications   Medication Instructions    cimetidine (Tagamet) 800 mg tablet TAKE 1 TABLET BY MOUTH EVERYDAY AT BEDTIME    ibandronate (BONIVA) 150 mg, oral, Every 30 days, Take in morning with full glass of water on an empty stomach. No food, drink, meds, or lying down for 60 minutes after.    linaCLOtide (LINZESS) 145 mcg, oral, Daily, Do not crush or chew.    LORazepam (Ativan) 0.5 mg tablet Take by mouth.    losartan (COZAAR) 50 mg, oral, Daily    omeprazole (PRILOSEC) 20 mg, oral, Daily before breakfast, Do not crush or chew.    triamcinolone (Kenalog) 0.1 % cream Topical, 2 times daily, For 2 weeks.        Objective   There were no vitals taken for this visit.   Physical Exam  Constitutional:       Appearance: Normal appearance.   Eyes:      Conjunctiva/sclera: Conjunctivae normal.   Cardiovascular:      Rate and Rhythm: Normal rate and regular rhythm.   Pulmonary:      Effort: Pulmonary effort is normal.      Breath sounds: Normal breath sounds.   Abdominal:      General: Abdomen is flat. Bowel sounds are normal.      Palpations:  Abdomen is soft.   Musculoskeletal:         General: Normal range of motion.      Cervical back: Normal range of motion.   Neurological:      Mental Status: She is alert.           Lab Results   Component Value Date    WBC 6.6 05/30/2023    WBC 4.3 (L) 05/24/2022    WBC 4.3 (L) 01/11/2021    HGB 13.9 05/30/2023    HGB 13.4 05/24/2022    HGB 14.6 01/11/2021    HCT 44.9 05/30/2023    HCT 42.2 05/24/2022    HCT 44.5 01/11/2021     05/30/2023     05/24/2022     01/11/2021     Lab Results   Component Value Date     05/30/2023     (H) 05/24/2022     05/12/2021    K 4.4 05/30/2023    K 4.7 05/24/2022    K 4.3 05/12/2021     05/30/2023     05/24/2022     05/12/2021    CO2 30 05/30/2023    CO2 32 05/24/2022    CO2 29 05/12/2021    BUN 9 05/30/2023    BUN 16 05/24/2022    BUN 13 05/12/2021    CREATININE 0.66 05/30/2023    CREATININE 0.64 05/24/2022    CREATININE 0.65 05/12/2021    CALCIUM 9.7 05/30/2023    CALCIUM 9.4 05/24/2022    CALCIUM 9.3 05/12/2021    PROT 6.8 05/30/2023    PROT 6.5 05/24/2022    BILITOT 1.1 05/30/2023    BILITOT 1.3 (H) 05/24/2022    ALKPHOS 56 05/30/2023    ALKPHOS 57 05/24/2022    ALT 19 05/30/2023    ALT 18 05/24/2022    AST 19 05/30/2023    AST 15 05/24/2022    GLUCOSE 99 05/30/2023    GLUCOSE 85 05/24/2022    GLUCOSE 82 05/12/2021           Joyce Marley is a 74 y.o. female who presents to GI clinic for chronic GERD, hiatal hernia, reflux esophagitis and constipation.    Chronic idiopathic constipation  Patient is a 74-year-old with history of anxiety, hypertension, hiatal hernia, reflux  esophagitis and chronic constipation.  She had colonoscopy which showed no anatomic abnormality.  I recommended she continue with Metamucil, Colace, her coffee, and she would like to try medication other than Linzess.  We will try Amitiza 8 mcg twice daily.    GERD (gastroesophageal reflux disease)  Patient is a 74-year-old with history of anxiety,  hypertension, hiatal hernia, reflux esophagitis on previous endoscopy. We had a long discussion concerning taking Prilosec at the lowest effective dose 20 mg/day to treat the reflux esophagitis, hiatal hernia and potentially prevent Pozo's esophagus. I recommend considering a repeat exam in 5 years and reviewed the safety of Prilosec. Bone density incidentally showed osteopenia and at this point she is going to work with exercise, calcium and vitamin D. I recommend repeat bone density in 2 years.     Hiatal hernia  See discussion on reflux esophagitis       Bimal Lam MD              [1]   Allergies  Allergen Reactions    Sulfa (Sulfonamide Antibiotics) Hives, Itching, Rash and Swelling    Fluoxetine Hives, Itching, Rash and Swelling

## 2025-04-29 NOTE — ASSESSMENT & PLAN NOTE
Patient is a 74-year-old with history of anxiety, hypertension, hiatal hernia, reflux  esophagitis and chronic constipation.  She had colonoscopy which showed no anatomic abnormality.  I recommended she continue with Metamucil, Colace, her coffee, and she would like to try medication other than Linzess.  We will try Amitiza 8 mcg twice daily.

## 2025-04-29 NOTE — ASSESSMENT & PLAN NOTE
Patient is a 74-year-old with history of anxiety, hypertension, hiatal hernia, reflux esophagitis on previous endoscopy. We had a long discussion concerning taking Prilosec at the lowest effective dose 20 mg/day to treat the reflux esophagitis, hiatal hernia and potentially prevent Pozo's esophagus. I recommend considering a repeat exam in 5 years and reviewed the safety of Prilosec. Bone density incidentally showed osteopenia and at this point she is going to work with exercise, calcium and vitamin D. I recommend repeat bone density in 2 years.

## 2025-04-30 DIAGNOSIS — M85.852 OSTEOPENIA OF NECK OF LEFT FEMUR: ICD-10-CM

## 2025-04-30 RX ORDER — IBANDRONATE SODIUM 150 MG/1
TABLET, FILM COATED ORAL
Qty: 3 TABLET | Refills: 4 | Status: SHIPPED | OUTPATIENT
Start: 2025-04-30

## 2025-09-29 ENCOUNTER — APPOINTMENT (OUTPATIENT)
Dept: OPHTHALMOLOGY | Facility: CLINIC | Age: 75
End: 2025-09-29
Payer: COMMERCIAL

## 2026-02-16 ENCOUNTER — APPOINTMENT (OUTPATIENT)
Dept: OPHTHALMOLOGY | Facility: CLINIC | Age: 76
End: 2026-02-16
Payer: COMMERCIAL